# Patient Record
Sex: FEMALE | Race: WHITE | NOT HISPANIC OR LATINO
[De-identification: names, ages, dates, MRNs, and addresses within clinical notes are randomized per-mention and may not be internally consistent; named-entity substitution may affect disease eponyms.]

---

## 2017-01-03 ENCOUNTER — APPOINTMENT (OUTPATIENT)
Dept: UROLOGY | Facility: CLINIC | Age: 37
End: 2017-01-03

## 2017-09-26 ENCOUNTER — APPOINTMENT (OUTPATIENT)
Dept: UROLOGY | Facility: CLINIC | Age: 37
End: 2017-09-26
Payer: COMMERCIAL

## 2017-09-26 VITALS
WEIGHT: 200 LBS | HEART RATE: 69 BPM | OXYGEN SATURATION: 95 % | BODY MASS INDEX: 35.44 KG/M2 | HEIGHT: 63 IN | SYSTOLIC BLOOD PRESSURE: 112 MMHG | DIASTOLIC BLOOD PRESSURE: 75 MMHG | TEMPERATURE: 97.7 F | RESPIRATION RATE: 18 BRPM

## 2017-09-26 PROCEDURE — 51798 US URINE CAPACITY MEASURE: CPT

## 2017-09-26 PROCEDURE — 99215 OFFICE O/P EST HI 40 MIN: CPT

## 2017-10-09 ENCOUNTER — APPOINTMENT (OUTPATIENT)
Dept: UROLOGY | Facility: CLINIC | Age: 37
End: 2017-10-09
Payer: COMMERCIAL

## 2017-10-09 ENCOUNTER — OUTPATIENT (OUTPATIENT)
Dept: OUTPATIENT SERVICES | Facility: HOSPITAL | Age: 37
LOS: 1 days | End: 2017-10-09
Payer: COMMERCIAL

## 2017-10-09 VITALS
TEMPERATURE: 98 F | HEART RATE: 72 BPM | DIASTOLIC BLOOD PRESSURE: 79 MMHG | RESPIRATION RATE: 16 BRPM | SYSTOLIC BLOOD PRESSURE: 123 MMHG

## 2017-10-09 DIAGNOSIS — R35.0 FREQUENCY OF MICTURITION: ICD-10-CM

## 2017-10-09 DIAGNOSIS — N30.10 INTERSTITIAL CYSTITIS (CHRONIC) W/OUT HEMATURIA: ICD-10-CM

## 2017-10-09 DIAGNOSIS — M79.1 MYALGIA: ICD-10-CM

## 2017-10-09 DIAGNOSIS — R30.0 DYSURIA: ICD-10-CM

## 2017-10-09 DIAGNOSIS — R31.29 OTHER MICROSCOPIC HEMATURIA: ICD-10-CM

## 2017-10-09 PROCEDURE — 52000 CYSTOURETHROSCOPY: CPT

## 2017-10-09 PROCEDURE — 51700 IRRIGATION OF BLADDER: CPT | Mod: 59

## 2017-10-10 ENCOUNTER — MESSAGE (OUTPATIENT)
Age: 37
End: 2017-10-10

## 2017-10-17 ENCOUNTER — APPOINTMENT (OUTPATIENT)
Dept: UROLOGY | Facility: CLINIC | Age: 37
End: 2017-10-17

## 2017-10-17 DIAGNOSIS — M79.1 MYALGIA: ICD-10-CM

## 2017-10-17 DIAGNOSIS — N30.10 INTERSTITIAL CYSTITIS (CHRONIC) WITHOUT HEMATURIA: ICD-10-CM

## 2017-10-17 DIAGNOSIS — R30.0 DYSURIA: ICD-10-CM

## 2017-10-17 DIAGNOSIS — R31.29 OTHER MICROSCOPIC HEMATURIA: ICD-10-CM

## 2017-10-17 LAB
APPEARANCE: CLEAR
BACTERIA UR CULT: NORMAL
BACTERIA: NEGATIVE
BILIRUBIN URINE: NEGATIVE
BLOOD URINE: NEGATIVE
COLOR: YELLOW
GLUCOSE QUALITATIVE U: NEGATIVE MG/DL
HYALINE CASTS: 2 /LPF
KETONES URINE: NEGATIVE
LEUKOCYTE ESTERASE URINE: NEGATIVE
MICROSCOPIC-UA: NORMAL
NITRITE URINE: NEGATIVE
PH URINE: 7.5
PROTEIN URINE: NEGATIVE MG/DL
RED BLOOD CELLS URINE: 1 /HPF
SPECIFIC GRAVITY URINE: 1.01
SQUAMOUS EPITHELIAL CELLS: 1 /HPF
UROBILINOGEN URINE: NEGATIVE MG/DL
WHITE BLOOD CELLS URINE: 0 /HPF

## 2019-05-30 ENCOUNTER — APPOINTMENT (OUTPATIENT)
Dept: UROLOGY | Facility: CLINIC | Age: 39
End: 2019-05-30

## 2019-06-07 ENCOUNTER — APPOINTMENT (OUTPATIENT)
Dept: OTOLARYNGOLOGY | Facility: CLINIC | Age: 39
End: 2019-06-07
Payer: COMMERCIAL

## 2019-06-07 VITALS
BODY MASS INDEX: 35.44 KG/M2 | DIASTOLIC BLOOD PRESSURE: 83 MMHG | HEIGHT: 63 IN | HEART RATE: 76 BPM | WEIGHT: 200 LBS | SYSTOLIC BLOOD PRESSURE: 121 MMHG

## 2019-06-07 PROCEDURE — 99244 OFF/OP CNSLTJ NEW/EST MOD 40: CPT | Mod: 25

## 2019-06-07 PROCEDURE — 31231 NASAL ENDOSCOPY DX: CPT

## 2019-06-07 PROCEDURE — 69420 INCISION OF EARDRUM: CPT

## 2019-06-07 NOTE — HISTORY OF PRESENT ILLNESS
[de-identified] : 39F with new right hearing loss beginning April 2019.  Symptoms started around the time of a cold.  Hearig loss constant with associated pressure. Saw Dr. Castellanos, had tube placed in May 2019 but this clogged.  Has been treated with zpak, clinda, biaxin, prednisone, all without improvement. No prior history of ear tubes, did have prior ear infections when younger.  No prior history of hearing loss, no family history of early hearing loss.  Allergic to PCN, sulfa, cipro/levaquin, doxycycline--Gets rashes or has difficulty breathing.  Not diabetic, no known risk factors for immunosuppression

## 2019-06-07 NOTE — PHYSICAL EXAM
[Normal] : orientation to person, place, and time: normal [de-identified] : E [de-identified] : L TM nl, R clogged T-tube, removed but no middle ear drainage, granulation tissue in middle ear

## 2019-06-07 NOTE — PROCEDURE
[FreeTextEntry3] : Procedure note: Right myringotomy\par \par Jun 07, 2019 \par \par Description of Operative Procedure:  Risks, benefits, and alternatives of the planned procedure were discussed with the patient prior to proceeding.  Risks would include but are not limited to bleeding, infection, persistent drainage, persistent perforation, or failure to improve hearing.  Benefits would include improvement in hearing.  Topical anesthetic was used to anesthetize the tympanic membrane.  A myringotomy was made.  The eardrum was thickened and the middle ear had granulation tissue but no no serous fluid.  Neomycin drops were administered.  The patient tolerated the procedure without complications.\par \par

## 2019-06-07 NOTE — REVIEW OF SYSTEMS
[Seasonal Allergies] : seasonal allergies [Hearing Loss] : hearing loss [Ear Pain] : ear pain [Ear Drainage] : ear drainage [Negative] : Heme/Lymph

## 2019-06-07 NOTE — CONSULT LETTER
[FreeTextEntry2] : Eric Mcgee MD [FreeTextEntry1] : Dear Dr. Mcgee,\par \par Thank you very much for your consultation request regarding Shanda Bojorquez.  As you know, she is a pleasant 39-year-old woman with right-sided hearing loss since April 2019.  She has been treated with multiple courses of antibiotics and steroids for otitis media without resolution, and also underwent tube placement.  \par \par Her exam today demonstrates a clogged right T-tube, and after removal there is extensive granulation filling the middle ear space but no active otorrhea.  Her audiogram from your office demonstrates a right conductive hearing loss, and she has a CT demonstrating partial opacification of the right middle ear and mastoid.  Lastly, she has culture results positive for MSSA.\par \par As we had discussed, I believe she has experienced a refractory otitis causing chronic middle ear inflammation and persistent conductive hearing loss.  We discussed options for treatment, including placement of a larger bore ear tube, right tympanomastoidectomy, or continued medical management.  Given her multiple antibiotic allergies, there are not good oral antibiotic options for her which have not already been given.  I recommended that we reexamine the ear under anesthesia, and if her middle ear space again demonstrates extensive granulation when attempting to place a larger caliber ear tube, I would then plan to move forward with tympanomastoidectomy.  She is understanding of risks, benefits, and alternatives of surgery and wishes to proceed.\par \par Thank you once again for the opportunity to participate in your patient's care, and I will continue to keep you updated as to her progress.\par \par Best regards,\par \par Kehinde Bearden MD\par Otology/Neurotology\par Department of Otolaryngology\par [facility]

## 2019-06-18 ENCOUNTER — APPOINTMENT (OUTPATIENT)
Dept: UROLOGY | Facility: CLINIC | Age: 39
End: 2019-06-18

## 2019-06-20 ENCOUNTER — OUTPATIENT (OUTPATIENT)
Dept: OUTPATIENT SERVICES | Facility: HOSPITAL | Age: 39
LOS: 1 days | End: 2019-06-20

## 2019-06-20 VITALS
WEIGHT: 210.1 LBS | SYSTOLIC BLOOD PRESSURE: 120 MMHG | HEIGHT: 62.5 IN | DIASTOLIC BLOOD PRESSURE: 80 MMHG | RESPIRATION RATE: 16 BRPM | TEMPERATURE: 99 F | HEART RATE: 60 BPM

## 2019-06-20 DIAGNOSIS — Z98.890 OTHER SPECIFIED POSTPROCEDURAL STATES: Chronic | ICD-10-CM

## 2019-06-20 DIAGNOSIS — H70.11 CHRONIC MASTOIDITIS, RIGHT EAR: ICD-10-CM

## 2019-06-20 DIAGNOSIS — Z90.49 ACQUIRED ABSENCE OF OTHER SPECIFIED PARTS OF DIGESTIVE TRACT: Chronic | ICD-10-CM

## 2019-06-20 DIAGNOSIS — H91.90 UNSPECIFIED HEARING LOSS, UNSPECIFIED EAR: ICD-10-CM

## 2019-06-20 LAB
ANION GAP SERPL CALC-SCNC: 11 MMO/L — SIGNIFICANT CHANGE UP (ref 7–14)
BUN SERPL-MCNC: 18 MG/DL — SIGNIFICANT CHANGE UP (ref 7–23)
CALCIUM SERPL-MCNC: 9.9 MG/DL — SIGNIFICANT CHANGE UP (ref 8.4–10.5)
CHLORIDE SERPL-SCNC: 98 MMOL/L — SIGNIFICANT CHANGE UP (ref 98–107)
CO2 SERPL-SCNC: 27 MMOL/L — SIGNIFICANT CHANGE UP (ref 22–31)
CREAT SERPL-MCNC: 0.73 MG/DL — SIGNIFICANT CHANGE UP (ref 0.5–1.3)
GLUCOSE SERPL-MCNC: 83 MG/DL — SIGNIFICANT CHANGE UP (ref 70–99)
HCG SERPL-ACNC: < 5 MIU/ML — SIGNIFICANT CHANGE UP
HCT VFR BLD CALC: 41.2 % — SIGNIFICANT CHANGE UP (ref 34.5–45)
HGB BLD-MCNC: 13.5 G/DL — SIGNIFICANT CHANGE UP (ref 11.5–15.5)
MCHC RBC-ENTMCNC: 29.6 PG — SIGNIFICANT CHANGE UP (ref 27–34)
MCHC RBC-ENTMCNC: 32.8 % — SIGNIFICANT CHANGE UP (ref 32–36)
MCV RBC AUTO: 90.4 FL — SIGNIFICANT CHANGE UP (ref 80–100)
NRBC # FLD: 0 K/UL — SIGNIFICANT CHANGE UP (ref 0–0)
PLATELET # BLD AUTO: 281 K/UL — SIGNIFICANT CHANGE UP (ref 150–400)
PMV BLD: 10.1 FL — SIGNIFICANT CHANGE UP (ref 7–13)
POTASSIUM SERPL-MCNC: 3.7 MMOL/L — SIGNIFICANT CHANGE UP (ref 3.5–5.3)
POTASSIUM SERPL-SCNC: 3.7 MMOL/L — SIGNIFICANT CHANGE UP (ref 3.5–5.3)
RBC # BLD: 4.56 M/UL — SIGNIFICANT CHANGE UP (ref 3.8–5.2)
RBC # FLD: 12.7 % — SIGNIFICANT CHANGE UP (ref 10.3–14.5)
SODIUM SERPL-SCNC: 136 MMOL/L — SIGNIFICANT CHANGE UP (ref 135–145)
WBC # BLD: 9.77 K/UL — SIGNIFICANT CHANGE UP (ref 3.8–10.5)
WBC # FLD AUTO: 9.77 K/UL — SIGNIFICANT CHANGE UP (ref 3.8–10.5)

## 2019-06-20 NOTE — H&P PST ADULT - NSICDXPASTSURGICALHX_GEN_ALL_CORE_FT
PAST SURGICAL HISTORY:  H/O bilateral breast reduction surgery 2015    H/O exploratory laparotomy 2014    History of cholecystectomy laparoscopic-2002

## 2019-06-20 NOTE — H&P PST ADULT - NSANTHOSAYNRD_GEN_A_CORE
No. COCO screening performed.  STOP BANG Legend: 0-2 = LOW Risk; 3-4 = INTERMEDIATE Risk; 5-8 = HIGH Risk

## 2019-06-20 NOTE — H&P PST ADULT - NSICDXPASTMEDICALHX_GEN_ALL_CORE_FT
PAST MEDICAL HISTORY:  Interstitial cystitis     PCOS (polycystic ovarian syndrome) PAST MEDICAL HISTORY:  Interstitial cystitis     PCOS (polycystic ovarian syndrome)     Pelvic floor dysfunction     Spontaneous  2015    Vulvodynia PAST MEDICAL HISTORY:  Interstitial cystitis     Obese     PCOS (polycystic ovarian syndrome)     Pelvic floor dysfunction     Spontaneous  2015    Vulvodynia

## 2019-06-20 NOTE — H&P PST ADULT - HISTORY OF PRESENT ILLNESS
Pt. is a 40 yo female with hearing difficulty in the right ear.  Pt. had a myringotomy tube that has since been removed .

## 2019-06-20 NOTE — H&P PST ADULT - NSICDXPROBLEM_GEN_ALL_CORE_FT
PROBLEM DIAGNOSES  Problem: Hearing loss  Assessment and Plan: Pt. is scheduled for a right mastoidectomy, right myringotomy and tube, fascia graft 6/27/19.

## 2019-06-20 NOTE — H&P PST ADULT - TEACHING/LEARNING FACTORS IMPACT ABILITY TO LEARN
ACP planning begun today, SDM is.   tommy smith--has directive at home and will bring in "my learning disability"

## 2019-06-22 PROBLEM — N30.10 INTERSTITIAL CYSTITIS (CHRONIC) WITHOUT HEMATURIA: Chronic | Status: ACTIVE | Noted: 2019-06-20

## 2019-06-22 PROBLEM — O03.9 COMPLETE OR UNSPECIFIED SPONTANEOUS ABORTION WITHOUT COMPLICATION: Chronic | Status: ACTIVE | Noted: 2019-06-20

## 2019-06-22 PROBLEM — E66.9 OBESITY, UNSPECIFIED: Chronic | Status: ACTIVE | Noted: 2019-06-20

## 2019-06-22 PROBLEM — M62.89 OTHER SPECIFIED DISORDERS OF MUSCLE: Chronic | Status: ACTIVE | Noted: 2019-06-20

## 2019-06-22 PROBLEM — E28.2 POLYCYSTIC OVARIAN SYNDROME: Chronic | Status: ACTIVE | Noted: 2019-06-20

## 2019-06-22 PROBLEM — N94.819 VULVODYNIA, UNSPECIFIED: Chronic | Status: ACTIVE | Noted: 2019-06-20

## 2019-06-26 ENCOUNTER — TRANSCRIPTION ENCOUNTER (OUTPATIENT)
Age: 39
End: 2019-06-26

## 2019-06-27 ENCOUNTER — APPOINTMENT (OUTPATIENT)
Dept: OTOLARYNGOLOGY | Facility: AMBULATORY SURGERY CENTER | Age: 39
End: 2019-06-27

## 2019-06-27 ENCOUNTER — OUTPATIENT (OUTPATIENT)
Dept: OUTPATIENT SERVICES | Facility: HOSPITAL | Age: 39
LOS: 1 days | Discharge: HOME | End: 2019-06-27
Payer: COMMERCIAL

## 2019-06-27 VITALS
RESPIRATION RATE: 18 BRPM | HEART RATE: 77 BPM | OXYGEN SATURATION: 98 % | DIASTOLIC BLOOD PRESSURE: 69 MMHG | HEIGHT: 62.5 IN | WEIGHT: 210.1 LBS | SYSTOLIC BLOOD PRESSURE: 118 MMHG | TEMPERATURE: 98 F

## 2019-06-27 VITALS — OXYGEN SATURATION: 100 % | DIASTOLIC BLOOD PRESSURE: 60 MMHG | SYSTOLIC BLOOD PRESSURE: 110 MMHG | HEART RATE: 90 BPM

## 2019-06-27 DIAGNOSIS — H70.11 CHRONIC MASTOIDITIS, RIGHT EAR: ICD-10-CM

## 2019-06-27 DIAGNOSIS — Z90.49 ACQUIRED ABSENCE OF OTHER SPECIFIED PARTS OF DIGESTIVE TRACT: Chronic | ICD-10-CM

## 2019-06-27 DIAGNOSIS — Z98.890 OTHER SPECIFIED POSTPROCEDURAL STATES: Chronic | ICD-10-CM

## 2019-06-27 PROCEDURE — 69436 CREATE EARDRUM OPENING: CPT | Mod: 59

## 2019-06-27 PROCEDURE — 69643 REVISE MIDDLE EAR & MASTOID: CPT

## 2019-06-27 NOTE — ASU DISCHARGE PLAN (ADULT/PEDIATRIC) - CALL YOUR DOCTOR IF YOU HAVE ANY OF THE FOLLOWING:
Fever greater than (need to indicate Fahrenheit or Celsius)/Inability to tolerate liquids or foods/Nausea and vomiting that does not stop/101/Increased irritability or sluggishness/Bleeding that does not stop/Pain not relieved by Medications Pain not relieved by Medications/101F/Nausea and vomiting that does not stop/Bleeding that does not stop/Fever greater than (need to indicate Fahrenheit or Celsius)/Increased irritability or sluggishness/Inability to tolerate liquids or foods

## 2019-06-27 NOTE — ASU PREOP CHECKLIST - NOTHING BY MOUTH SINCE
Post-Care Instructions: I reviewed with the patient in detail post-care instructions. Patient is to wear sunprotection, and avoid picking at any of the treated lesions. Pt may apply Vaseline to crusted or scabbing areas. Duration Of Freeze Thaw-Cycle (Seconds): 0 Detail Level: Simple Render Post-Care Instructions In Note?: no Number Of Freeze-Thaw Cycles: 3 freeze-thaw cycles Consent: The patient's consent was obtained including but not limited to risks of crusting, scabbing, blistering, scarring, darker or lighter pigmentary change, recurrence, incomplete removal and infection. 26-Jun-2019 20:30

## 2019-06-27 NOTE — ASU DISCHARGE PLAN (ADULT/PEDIATRIC) - ASU DC SPECIAL INSTRUCTIONSFT
[address] [place] [address] Patient Status:  Inpatient    1955 MRN [id number]   [facility] 4SW-A Attending Mirella Headley, *   Hosp Day # 1 PCP Dilip BOLTON     Cc: arrest    History of Present Illness: Lispro 100 TID before meals  ALPRAZolam 0.25 MG TID PRN anxiety   albuterol nebulizer q 4 hours PRN wheezing  nystatin 827117 UNIT/GM External Ointment daily   DiphenhydrAMINE HCl 25 q 6 hours PRN itching   Levothyroxine Sodium 50 MCG daily with breakfast  There is some mild narrowing of the prepontine cistern. Findings are suspicious for early herniation. The critical value results were called to Dr. Lynn Prince at 1600 hours on  4/1/2018. Result read back was performed.      Dictated by: Deedee Urena and children at bedside, they understand grave prognosis and no hope for meaningful recovery. Awaiting more family members to arrive to transition to palliative care. [facility] will continue to follow the patient while in-house. See postoperative tympanomastoidectomy instruction sheet.

## 2019-06-28 LAB
SPECIMEN SOURCE: SIGNIFICANT CHANGE UP
SPECIMEN SOURCE: SIGNIFICANT CHANGE UP

## 2019-07-02 LAB
CULTURE - EAR: SIGNIFICANT CHANGE UP
CULTURE - EAR: SIGNIFICANT CHANGE UP

## 2019-07-03 ENCOUNTER — APPOINTMENT (OUTPATIENT)
Dept: INFECTIOUS DISEASE | Facility: CLINIC | Age: 39
End: 2019-07-03
Payer: COMMERCIAL

## 2019-07-03 VITALS
OXYGEN SATURATION: 98 % | HEIGHT: 63 IN | HEART RATE: 68 BPM | WEIGHT: 212 LBS | TEMPERATURE: 96.8 F | SYSTOLIC BLOOD PRESSURE: 127 MMHG | BODY MASS INDEX: 37.56 KG/M2 | DIASTOLIC BLOOD PRESSURE: 85 MMHG

## 2019-07-03 DIAGNOSIS — H60.90 UNSPECIFIED OTITIS EXTERNA, UNSPECIFIED EAR: ICD-10-CM

## 2019-07-03 DIAGNOSIS — Z87.898 PERSONAL HISTORY OF OTHER SPECIFIED CONDITIONS: ICD-10-CM

## 2019-07-03 DIAGNOSIS — Z82.49 FAMILY HISTORY OF ISCHEMIC HEART DISEASE AND OTHER DISEASES OF THE CIRCULATORY SYSTEM: ICD-10-CM

## 2019-07-03 DIAGNOSIS — Z82.5 FAMILY HISTORY OF ASTHMA AND OTHER CHRONIC LOWER RESPIRATORY DISEASES: ICD-10-CM

## 2019-07-03 DIAGNOSIS — Z83.42 FAMILY HISTORY OF FAMILIAL HYPERCHOLESTEROLEMIA: ICD-10-CM

## 2019-07-03 LAB — SPECIMEN SOURCE: SIGNIFICANT CHANGE UP

## 2019-07-03 PROCEDURE — 99243 OFF/OP CNSLTJ NEW/EST LOW 30: CPT

## 2019-07-03 NOTE — CONSULT LETTER
[( Thank you for referring [unfilled] for consultation for _____ )] : Thank you for referring [unfilled] for consultation for [unfilled] [Dear  ___] : Dear  [unfilled], [Please see my note below.] : Please see my note below. [FreeTextEntry2] : Dr. Deondre Bearden [FreeTextEntry3] : Thank you for allowing me to participate in the care of this patient.  Please feel free to contact me with any questions.\par \par Sincerely, \par Michael I. Oppenheim, MD\par \par

## 2019-07-03 NOTE — DATA REVIEWED
[FreeTextEntry1] : 5/20 ear culture (taken via swab per patient but after TM tube placed)\par MSSA, inducible clinda resistance Linezolid sensitive\par \par OR Cultures (bacterial, fungal) NG

## 2019-07-03 NOTE — REVIEW OF SYSTEMS
[Chills] : no chills [Fever] : no fever [Feeling Sick] : not feeling sick [Feeling Tired] : not feeling tired [Normal Appetite] : normal appetite [Eye Pain] : no eye pain [Discharge From Eyes] : no purulent discharge from the eyes [Red Eyes] : eyes not red [Eyesight Problems] : no eyesight problems [Nasal Discharge] : no nasal discharge [As Noted in HPI] : as noted in HPI [Sore Throat] : no sore throat [Hoarseness] : no hoarseness [Chest Pain] : no chest pain [Palpitations] : no palpitations [Leg Claudication] : no intermittent leg claudication [Lower Ext Edema] : no lower extremity edema [Wheezing] : no wheezing [Shortness Of Breath] : no shortness of breath [SOB on Exertion] : no shortness of breath during exertion [Cough] : no cough [Abdominal Pain] : no abdominal pain [Sputum] : not coughing up ~M sputum [Vomiting] : no vomiting [Constipation] : no constipation [Dysuria] : no dysuria [Diarrhea] : no diarrhea [Joint Pain] : no joint pain [Joint Swelling] : no joint swelling [Joint Stiffness] : no joint stiffness [Limb Pain] : no limb pain [Skin Wound] : no skin wound [Skin Lesions] : no skin lesions [Confused] : no confusion [Itching] : no itching [Convulsions] : no convulsions

## 2019-07-03 NOTE — ASSESSMENT
[FreeTextEntry1] : 39 year old s/p typanoplasty, debridement of middle ear and mastoid, mastoidectomy with prior culture of presumably middle ear fluid with MSSA, now with negative OR cultures but after a number of courses of Clindamycin.  Patient also with allergies to most major oral drug classes including penicillin, cephalosporin, quinolone, tetracycline classes. \par \par For Staph, therefore, will initiate Linezolid 600 mg po BID.  Would prefer to have gram negative coverage as well but no good oral options.\par \par Discussed the option of PICC line for IV therapy with patient if Linezolid not succesful; patient noted that she will be going back to work in a few weeks and cannot have PICC line at work.  Discussed whether it made more sense to start with PICC now to assure success if PICC in future not going to be feasible; patient opts to try Linezolid first.

## 2019-07-03 NOTE — HISTORY OF PRESENT ILLNESS
[FreeTextEntry1] : 39 year old with multiple drug allergies - in spring of 2019 developed pain in eight ear, behind ear and in neck after URI.  Also noted hearing loss.  [facility] treated with Z-Pack for otitis.  Didn't improve, saw ENT in [facility] who started Prednisone then Clindamycin and placed ear tube and took specimen from canal after tube in place (per patient).  Cluture revealed MSSA with inducible resistance to Clinda.  Patient did not improve and eventually saw Dr. Bearden.  CT scan 6/3 showed opacification of middle ear and mastoids without obvious cholesteatoma.  Multiple attempts at tube failed because of tube clogging.  Eventually underwent myringotomy/mastoidectomy and tube placement on 6/27, put back on Clindamycin and Prednisone, now referred for evaluation.  \par \par No fever, chills.  Still had drainage from ear (Yellow/Orange).  Hearing not improved but packing still in ear.  Pain significantly diminished since surgery.\par \par Patient with reported allergies to PCN, Sulfa drugs, quinolones, cephalosporins, and doxycycline (all hives, PCN and sulfur associated with SOB as well).\par \par Prior history of mastitis with Staph and Serratia (after breast reduction) treated with vanco/aztreo....

## 2019-07-03 NOTE — PHYSICAL EXAM
[General Appearance - Alert] : alert [Sclera] : the sclera and conjunctiva were normal [General Appearance - In No Acute Distress] : in no acute distress [General Appearance - Well Nourished] : well nourished [FreeTextEntry1] : Normal left TM.  Right TM only partially visualized, Some inflammation of canal; whitish material over TM (packing?).  Well healing scar behind right ear [PERRL With Normal Accommodation] : pupils were equal in size, round, reactive to light [Oropharynx] : the oropharynx was normal with no thrush [Neck Appearance] : the appearance of the neck was normal [Neck Cervical Mass (___cm)] : no neck mass was observed [Auscultation Breath Sounds / Voice Sounds] : lungs were clear to auscultation bilaterally [Heart Rate And Rhythm] : heart rate was normal and rhythm regular [Heart Sounds] : normal S1 and S2 [Heart Sounds Gallop] : no gallops [Murmurs] : no murmurs [Heart Sounds Pericardial Friction Rub] : no pericardial rub [Abdomen Tenderness] : non-tender [Abdomen Soft] : soft [Bowel Sounds] : normal bowel sounds [Abdomen Mass (___ Cm)] : no abdominal mass palpated [] : no rash [No Palpable Adenopathy] : no palpable adenopathy [Skin Color & Pigmentation] : normal skin color and pigmentation [Skin Lesions] : no skin lesions

## 2019-07-05 DIAGNOSIS — H70.11 CHRONIC MASTOIDITIS, RIGHT EAR: ICD-10-CM

## 2019-07-11 ENCOUNTER — APPOINTMENT (OUTPATIENT)
Dept: OTOLARYNGOLOGY | Facility: CLINIC | Age: 39
End: 2019-07-11
Payer: COMMERCIAL

## 2019-07-11 VITALS
SYSTOLIC BLOOD PRESSURE: 122 MMHG | HEART RATE: 76 BPM | BODY MASS INDEX: 37.21 KG/M2 | HEIGHT: 63 IN | WEIGHT: 210 LBS | DIASTOLIC BLOOD PRESSURE: 84 MMHG

## 2019-07-11 PROCEDURE — 99024 POSTOP FOLLOW-UP VISIT: CPT

## 2019-07-11 RX ORDER — PREDNISONE 10 MG/1
10 TABLET ORAL
Qty: 30 | Refills: 0 | Status: DISCONTINUED | COMMUNITY
Start: 2019-06-27 | End: 2019-07-11

## 2019-07-11 NOTE — CONSULT LETTER
[FreeTextEntry2] : Eric Mcgee MD [FreeTextEntry1] : Dear Dr. Mcgee,\par \par Shanda presents for her first postoperative visit.  As you know, she is a very pleasant 39-year-old woman who is now 2 and a half weeks status post right tympanomastoidectomy and repeat ear tube placement.  At the time of surgery she was found to have significant granulation and inflammatory tissue in the middle ear space and mastoid cavity which was surgically debrided.  Her postoperative course thus far has been uncomplicated, and she already reports some hearing improvement.  She has also seen the infectious disease team here at [facility] since surgery, and is currently on a prolonged course of Linezolid.  Exam today shows a patent dry tube in the right ear, normal facial function, in well-healed postauricular incision.  Tuning fork exam appears consistent with a mild conductive hearing loss.  She will continue Linezolid per ID recommendations and followup with me in one month.\par \par Thank you once again for the opportunity to participate in your patient's care, and I will continue to keep you updated as to her progress.\par \par Best regards,\par \par Kehinde Bearden MD\par Otology/Neurotology\par Department of Otolaryngology\par [facility]

## 2019-07-11 NOTE — REASON FOR VISIT
[Subsequent Evaluation] : a subsequent evaluation for [Other: _____] : [unfilled] [FreeTextEntry2] : follow s/p Right tympanoplasty with mastoidectomy, right myringotomy and tube insertion, 6/27/19

## 2019-07-11 NOTE — PHYSICAL EXAM
[de-identified] : packing removed R ear canal; right postauricular incision healing well, no erythema, mild superior edema in scalp region. [de-identified] : R TM with dry patent tube, Larios to right, Bozenane AC>BC right ear [Normal] : no rashes [FreeTextEntry2] : Facial nerve function is House Brackmann 1/6 in right ear and 1/6 in left ear.

## 2019-07-17 ENCOUNTER — APPOINTMENT (OUTPATIENT)
Dept: INFECTIOUS DISEASE | Facility: CLINIC | Age: 39
End: 2019-07-17
Payer: COMMERCIAL

## 2019-07-17 VITALS
TEMPERATURE: 98.5 F | HEART RATE: 78 BPM | DIASTOLIC BLOOD PRESSURE: 80 MMHG | SYSTOLIC BLOOD PRESSURE: 123 MMHG | HEIGHT: 63 IN | OXYGEN SATURATION: 98 %

## 2019-07-17 DIAGNOSIS — Z51.81 ENCOUNTER FOR THERAPEUTIC DRUG LVL MONITORING: ICD-10-CM

## 2019-07-17 DIAGNOSIS — Z88.9 ALLERGY STATUS TO UNSPECIFIED DRUGS, MEDICAMENTS AND BIOLOGICAL SUBSTANCES: ICD-10-CM

## 2019-07-17 PROCEDURE — 99213 OFFICE O/P EST LOW 20 MIN: CPT

## 2019-07-17 NOTE — PHYSICAL EXAM
[Auscultation Breath Sounds / Voice Sounds] : lungs were clear to auscultation bilaterally [Heart Rate And Rhythm] : heart rate was normal and rhythm regular [Heart Sounds] : normal S1 and S2 [Heart Sounds Gallop] : no gallops [Murmurs] : no murmurs [Heart Sounds Pericardial Friction Rub] : no pericardial rub [Bowel Sounds] : normal bowel sounds [Abdomen Soft] : soft [Abdomen Tenderness] : non-tender [] : no hepato-splenomegaly [Abdomen Mass (___ Cm)] : no abdominal mass palpated [FreeTextEntry1] : No cervical ALIX

## 2019-07-17 NOTE — HISTORY OF PRESENT ILLNESS
[FreeTextEntry1] : 2 weeks into linezolid (with Clindamycin)-  agrees to  full 4 weeks.\par \par No drainage form ear, no pain.  Sense of fullness / pressure / clogged sensation (had contacted and discussed with Dr. Bearden).\par \par No fever, no chills.  Slight GI upset from medication (slight nausea and burning in esophagus), no diarrhea but stool slightly soft.  No easy bruising.  No change in appetite.\par \par

## 2019-07-17 NOTE — ASSESSMENT
[FreeTextEntry1] : Doing well two weeks into Linezolid + clindamycin for MSSA, 3 weeks s/p typanoplasty/mastoidectomy.\par \par To complete full 4 weeks of therapy. \par \par Advised that ear looks well and tube looks patent but with persistent post-op changes that should improve over time.\par \par Will check Plt / LFTs today to assure no toxicity from Linezolid.  \par \par Patient to f/u with Dr. Bearden, return to see me PRN only.

## 2019-07-17 NOTE — REASON FOR VISIT
[Follow-Up: _____] : a [unfilled] follow-up visit [FreeTextEntry1] : otomastoidits / multiple drug allergies

## 2019-07-30 LAB — FUNGUS SPEC QL CULT: SIGNIFICANT CHANGE UP

## 2019-07-31 ENCOUNTER — APPOINTMENT (OUTPATIENT)
Dept: OTOLARYNGOLOGY | Facility: CLINIC | Age: 39
End: 2019-07-31
Payer: COMMERCIAL

## 2019-07-31 VITALS
HEART RATE: 83 BPM | WEIGHT: 210 LBS | BODY MASS INDEX: 37.21 KG/M2 | SYSTOLIC BLOOD PRESSURE: 126 MMHG | HEIGHT: 63 IN | DIASTOLIC BLOOD PRESSURE: 79 MMHG

## 2019-07-31 PROCEDURE — 99024 POSTOP FOLLOW-UP VISIT: CPT

## 2019-07-31 NOTE — PHYSICAL EXAM
[de-identified] : dry patent right ear tube, thin film of dried debris removed from surface of TM, patient noted subjective improvement; Rinne positive right ear, Larios weakly lateralizes to the right [de-identified] : dry right ear canal [FreeTextEntry2] : Facial nerve function is House Brackmann 1/6 in right ear and 1/6 in left ear. [Normal] : no rashes

## 2019-07-31 NOTE — HISTORY OF PRESENT ILLNESS
[de-identified] : hearing loss improved relative to before surgery, but still with "clogged ear" sensation and fullness, no drainage

## 2019-07-31 NOTE — CONSULT LETTER
[FreeTextEntry2] : Eric Mcgee MD [FreeTextEntry1] : Dear Dr. Mcgee,\par \par Shanda presents today for followup.  She is now one month status post right tympanomastoidectomy and tube placement.  Since her last visit she notes further improvement in her right hearing loss, and has not had any ear drainage.  She still does not feel like her hearing has normalized, but it is subjectively better than just before surgery.  \par \par Otoscopic exam today shows a dry patient right ear tube.  Larios lateralizes weakly to the right and Rinne is positive in the right ear.\par \par Shanda is doing well following right tympanomastoidectomy.  She should continue to maintain dry ear precautions and complete her one month oral Linezolid regimen as per ID recommendations.  I will plan to see her back in 2 months with a postoperative audiogram.\par \par Thank you once again for the opportunity to participate in your patient's care, and I will continue to keep you updated as to her progress.\par \par Best regards,\par \par Kehinde Bearden MD\par Otology/Neurotology\par Department of Otolaryngology\par [facility]

## 2019-09-04 LAB
ALBUMIN SERPL ELPH-MCNC: 4.2 G/DL
ALBUMIN SERPL ELPH-MCNC: 4.4 G/DL
ALP BLD-CCNC: 52 U/L
ALP BLD-CCNC: 61 U/L
ALT SERPL-CCNC: 17 U/L
ALT SERPL-CCNC: 18 U/L
ANION GAP SERPL CALC-SCNC: 11 MMOL/L
ANION GAP SERPL CALC-SCNC: 13 MMOL/L
AST SERPL-CCNC: 12 U/L
AST SERPL-CCNC: 16 U/L
BASOPHILS # BLD AUTO: 0.06 K/UL
BASOPHILS NFR BLD AUTO: 0.6 %
BILIRUB SERPL-MCNC: 0.3 MG/DL
BILIRUB SERPL-MCNC: <0.2 MG/DL
BUN SERPL-MCNC: 14 MG/DL
BUN SERPL-MCNC: 18 MG/DL
CALCIUM SERPL-MCNC: 10.4 MG/DL
CALCIUM SERPL-MCNC: 9.3 MG/DL
CHLORIDE SERPL-SCNC: 102 MMOL/L
CHLORIDE SERPL-SCNC: 98 MMOL/L
CO2 SERPL-SCNC: 26 MMOL/L
CO2 SERPL-SCNC: 26 MMOL/L
CREAT SERPL-MCNC: 0.72 MG/DL
CREAT SERPL-MCNC: 0.73 MG/DL
EOSINOPHIL # BLD AUTO: 0.14 K/UL
EOSINOPHIL NFR BLD AUTO: 1.4 %
HCT VFR BLD CALC: 38.8 %
HGB BLD-MCNC: 12.4 G/DL
IMM GRANULOCYTES NFR BLD AUTO: 0.3 %
LYMPHOCYTES # BLD AUTO: 2.49 K/UL
LYMPHOCYTES NFR BLD AUTO: 24.9 %
MAN DIFF?: NORMAL
MCHC RBC-ENTMCNC: 29.6 PG
MCHC RBC-ENTMCNC: 32 GM/DL
MCV RBC AUTO: 92.6 FL
MONOCYTES # BLD AUTO: 0.86 K/UL
MONOCYTES NFR BLD AUTO: 8.6 %
NEUTROPHILS # BLD AUTO: 6.44 K/UL
NEUTROPHILS NFR BLD AUTO: 64.2 %
PLATELET # BLD AUTO: 226 K/UL
POTASSIUM SERPL-SCNC: 4.3 MMOL/L
POTASSIUM SERPL-SCNC: 5.2 MMOL/L
PROT SERPL-MCNC: 6.5 G/DL
PROT SERPL-MCNC: 7.5 G/DL
RBC # BLD: 4.19 M/UL
RBC # FLD: 12.5 %
SODIUM SERPL-SCNC: 137 MMOL/L
SODIUM SERPL-SCNC: 139 MMOL/L
WBC # FLD AUTO: 10.02 K/UL

## 2019-09-20 ENCOUNTER — APPOINTMENT (OUTPATIENT)
Dept: OTOLARYNGOLOGY | Facility: CLINIC | Age: 39
End: 2019-09-20
Payer: COMMERCIAL

## 2019-09-20 PROCEDURE — 92567 TYMPANOMETRY: CPT

## 2019-09-20 PROCEDURE — 99024 POSTOP FOLLOW-UP VISIT: CPT

## 2019-09-20 PROCEDURE — 92557 COMPREHENSIVE HEARING TEST: CPT

## 2019-09-20 RX ORDER — ONDANSETRON 4 MG/1
4 TABLET, ORALLY DISINTEGRATING ORAL
Qty: 30 | Refills: 0 | Status: DISCONTINUED | COMMUNITY
Start: 2019-06-27 | End: 2019-09-20

## 2019-09-20 RX ORDER — HYDROCODONE BITARTRATE AND ACETAMINOPHEN 5; 325 MG/1; MG/1
5-325 TABLET ORAL
Qty: 30 | Refills: 0 | Status: DISCONTINUED | COMMUNITY
Start: 2019-06-27 | End: 2019-09-20

## 2019-09-20 RX ORDER — CLINDAMYCIN HYDROCHLORIDE 300 MG/1
300 CAPSULE ORAL EVERY 6 HOURS
Qty: 56 | Refills: 0 | Status: DISCONTINUED | COMMUNITY
Start: 2019-06-27 | End: 2019-09-20

## 2019-09-20 NOTE — REASON FOR VISIT
[Subsequent Evaluation] : a subsequent evaluation for [FreeTextEntry2] : s/p Right tympanoplasty with mastoidectomy, right myringotomy and tube insertion, 6/27/19

## 2019-09-20 NOTE — HISTORY OF PRESENT ILLNESS
[de-identified] : 39 year old female s/p Right tympanoplasty with mastoidectomy, right myringotomy and tube insertion, 6/27/19. Reports hearing loss has improved since surgery. Reports mild intermittent otalgia. Denies otorrhea, ear infection, tinnitus, dizziness.

## 2019-09-20 NOTE — PHYSICAL EXAM
[de-identified] : Patent right ear tube, tympanic membrane translucent without middle ear inflammation

## 2019-11-29 NOTE — H&P PST ADULT - NS MD HP INPLANTS MED DEV
Pt ambulated to the restroom, pt instructed on urine sample needed and verbalizes understanding.    None

## 2020-03-20 ENCOUNTER — APPOINTMENT (OUTPATIENT)
Dept: OTOLARYNGOLOGY | Facility: CLINIC | Age: 40
End: 2020-03-20

## 2020-05-14 ENCOUNTER — TRANSCRIPTION ENCOUNTER (OUTPATIENT)
Age: 40
End: 2020-05-14

## 2020-07-14 ENCOUNTER — APPOINTMENT (OUTPATIENT)
Dept: OTOLARYNGOLOGY | Facility: CLINIC | Age: 40
End: 2020-07-14
Payer: COMMERCIAL

## 2020-07-14 VITALS
DIASTOLIC BLOOD PRESSURE: 87 MMHG | SYSTOLIC BLOOD PRESSURE: 125 MMHG | TEMPERATURE: 98.1 F | HEART RATE: 69 BPM | WEIGHT: 224 LBS | HEIGHT: 63 IN | BODY MASS INDEX: 39.69 KG/M2

## 2020-07-14 PROCEDURE — 92567 TYMPANOMETRY: CPT

## 2020-07-14 PROCEDURE — 92557 COMPREHENSIVE HEARING TEST: CPT

## 2020-07-14 PROCEDURE — 99213 OFFICE O/P EST LOW 20 MIN: CPT | Mod: 25

## 2020-07-14 PROCEDURE — 92504 EAR MICROSCOPY EXAMINATION: CPT

## 2020-07-14 RX ORDER — LINEZOLID 600 MG/1
600 TABLET, FILM COATED ORAL
Qty: 28 | Refills: 0 | Status: DISCONTINUED | COMMUNITY
Start: 2019-07-03 | End: 2020-07-14

## 2020-07-14 NOTE — PROCEDURE
[FreeTextEntry3] : Procedure note:  Binocular microscopy\par \par Jul 14, 2020 \par \par Inspection of the ears was performed under binocular microscopy because of need to accurately visualize otologic landmarks and potential pathologic conditions that would not otherwise be visible through simple otoscopic exam.\par

## 2020-07-14 NOTE — REASON FOR VISIT
[Subsequent Evaluation] : a subsequent evaluation for [Other: _____] : [unfilled] [FreeTextEntry2] : follow up for chronic Right mastoiditis, CHL and Right ETD, s/p Right tympanoplasty with mastoidectomy, right myringotomy and tube insertion, 6/27/19.

## 2020-07-14 NOTE — CONSULT LETTER
[FreeTextEntry2] : Eric Mcgee MD [FreeTextEntry1] : Dear Dr. Mcgee,\par \par Shanda Bojorquez presents for followup for her right eustachian tube dysfunction. As you know, she is a very pleasant 40-year-old woman who is more than one year status post right tympanomastoidectomy with ear tube insertion.  Overall she's been doing well since her last visit in September without pain or drainage.  Her exam today shows a patent dry right ear tube, and audiogram shows stable improved right conductive hearing loss and relative to her early postoperative testing.  Given the stability in her exam today, I recommended followup in 6 months.  \par \par Thank you once again for the opportunity to participate in your patient's care, and I will keep you updated as to her progress.\par \par Best regards,\par \par Kehinde Bearden\par Otology/Neurotology\par Department of Otolaryngology\par [facility]\par [facility]

## 2020-07-14 NOTE — DATA REVIEWED
[de-identified] : I personally reviewed the patient's audiogram, which shows stable hearing b/l.\par

## 2020-07-14 NOTE — HISTORY OF PRESENT ILLNESS
[de-identified] : 40 year old female follow up for chronic Right mastoiditis, CHL and Right ETD, s/p Right tympanoplasty with mastoidectomy, right myringotomy and tube insertion, 6/27/19.  Reports ear itching with intermittent pain below/behind ear.  States has ear pressure on Right, tube in place.  Denies changes in hearing, dizziness or vertigo.  Reports occasional headaches and nausea, no vomiting.  Reports intermittent bilateral tinnitus, no otorrhea.  Denies recent fevers and/or infections.

## 2020-07-14 NOTE — PHYSICAL EXAM
[de-identified] : Patent right ear tube, tympanic membrane translucent without middle ear inflammation [Normal] : no rashes [FreeTextEntry2] : Facial nerve function is House Brackmann 1/6 in right ear and 1/6 in left ear.

## 2020-07-24 ENCOUNTER — APPOINTMENT (OUTPATIENT)
Dept: OTOLARYNGOLOGY | Facility: CLINIC | Age: 40
End: 2020-07-24

## 2020-07-28 ENCOUNTER — APPOINTMENT (OUTPATIENT)
Dept: OTOLARYNGOLOGY | Facility: CLINIC | Age: 40
End: 2020-07-28

## 2020-08-14 ENCOUNTER — APPOINTMENT (OUTPATIENT)
Dept: OTOLARYNGOLOGY | Facility: CLINIC | Age: 40
End: 2020-08-14
Payer: COMMERCIAL

## 2020-08-14 VITALS — WEIGHT: 224 LBS | BODY MASS INDEX: 39.69 KG/M2 | HEIGHT: 63 IN | TEMPERATURE: 98.1 F

## 2020-08-14 PROCEDURE — 92504 EAR MICROSCOPY EXAMINATION: CPT

## 2020-08-14 PROCEDURE — 99214 OFFICE O/P EST MOD 30 MIN: CPT | Mod: 25

## 2020-08-14 RX ORDER — METHYLPREDNISOLONE 4 MG/1
4 TABLET ORAL
Qty: 1 | Refills: 0 | Status: DISCONTINUED | COMMUNITY
Start: 2020-08-06 | End: 2020-08-14

## 2020-08-14 RX ORDER — MULTIVIT-MINS NO.5/FOLIC ACID 1 MG
CAPSULE ORAL
Refills: 0 | Status: DISCONTINUED | COMMUNITY
End: 2020-08-14

## 2020-08-14 NOTE — PHYSICAL EXAM
[Rinne Test Air Conduction Persists > Bone Conduction Right] : air conduction greater than bone conduction on the right [Rinne Test Air Conduction Persists > Bone Conduction Left] : air conduction greater than bone conduction on the left [Larios Test Lateralizes To Right] : tone lateralization to the right [Normal] : no rashes [Hearing Loss Right Only] : normal [Hearing Loss Left Only] : normal [de-identified] : PE tube in-situ, middle ear space dry, small polyp adjacent to tube

## 2020-08-14 NOTE — REASON FOR VISIT
[Subsequent Evaluation] : a subsequent evaluation for [Other: _____] : [unfilled] [FreeTextEntry2] :  right ear drainage, chronic Right mastoiditis, CHL and Right ETD, s/p Right tympanoplasty with mastoidectomy, right myringotomy and tube insertion, 6/27/19

## 2020-08-14 NOTE — HISTORY OF PRESENT ILLNESS
[de-identified] : 40 year old female follow up for right ear drainage.  chronic Right mastoiditis, CHL and Right ETD, s/p Right tympanoplasty with mastoidectomy, right myringotomy and tube insertion, 6/27/19. Reports intermittent right ear drainage with otalgia since 08/05/2020. Reports slight decrease in hearing. Denies tinnitus, fevers or infections, dizziness.  Drainage improved since starting clindamycin.

## 2020-08-14 NOTE — PROCEDURE
[FreeTextEntry3] : Procedure note:  Binocular microscopy\par \par Aug 14, 2020 \par \par Inspection of the ears was performed under binocular microscopy because of need to accurately visualize otologic landmarks and potential pathologic conditions that would not otherwise be visible through simple otoscopic exam.\par

## 2020-09-02 ENCOUNTER — APPOINTMENT (OUTPATIENT)
Dept: OTOLARYNGOLOGY | Facility: CLINIC | Age: 40
End: 2020-09-02
Payer: COMMERCIAL

## 2020-09-02 VITALS — TEMPERATURE: 97.6 F | WEIGHT: 224 LBS | BODY MASS INDEX: 39.69 KG/M2 | HEIGHT: 63 IN

## 2020-09-02 PROCEDURE — 99213 OFFICE O/P EST LOW 20 MIN: CPT | Mod: 25

## 2020-09-02 PROCEDURE — 92504 EAR MICROSCOPY EXAMINATION: CPT

## 2020-09-02 NOTE — PROCEDURE
[FreeTextEntry3] : Procedure note:  Binocular microscopy\par \par Sep 02, 2020 \par \par Inspection of the ears was performed under binocular microscopy because of need to accurately visualize otologic landmarks and potential pathologic conditions that would not otherwise be visible through simple otoscopic exam.\par

## 2020-09-02 NOTE — DATA REVIEWED
[de-identified] : I personally reviewed temporal bone CT images and the report, which shows minimal opacification right mastoid tip inferiorly, most of mastoid cavity well-aerated

## 2020-09-02 NOTE — PHYSICAL EXAM
[Normal] : no rashes [de-identified] : patent dry right PE tube, no granulation/polyp; L TM intact

## 2020-09-02 NOTE — HISTORY OF PRESENT ILLNESS
[de-identified] : 40 year old female follow up for right ear drainage.  chronic Right mastoiditis, CHL and Right ETD, s/p Right tympanoplasty with mastoidectomy, right myringotomy and tube insertion, 6/27/19. Reports she is feeling better, no otorrhea, no otalgia.  Mild right ear fullness, back to her baseline.

## 2020-12-16 ENCOUNTER — APPOINTMENT (OUTPATIENT)
Dept: OTOLARYNGOLOGY | Facility: CLINIC | Age: 40
End: 2020-12-16

## 2021-02-19 ENCOUNTER — APPOINTMENT (OUTPATIENT)
Dept: OTOLARYNGOLOGY | Facility: CLINIC | Age: 41
End: 2021-02-19
Payer: COMMERCIAL

## 2021-02-19 VITALS
HEART RATE: 65 BPM | SYSTOLIC BLOOD PRESSURE: 120 MMHG | WEIGHT: 198 LBS | HEIGHT: 63 IN | TEMPERATURE: 98 F | BODY MASS INDEX: 35.08 KG/M2 | DIASTOLIC BLOOD PRESSURE: 80 MMHG

## 2021-02-19 PROCEDURE — 69433 CREATE EARDRUM OPENING: CPT

## 2021-02-19 PROCEDURE — 99072 ADDL SUPL MATRL&STAF TM PHE: CPT

## 2021-02-19 PROCEDURE — 99213 OFFICE O/P EST LOW 20 MIN: CPT | Mod: 25

## 2021-02-19 RX ORDER — CLINDAMYCIN HYDROCHLORIDE 300 MG/1
300 CAPSULE ORAL EVERY 6 HOURS
Qty: 40 | Refills: 0 | Status: DISCONTINUED | COMMUNITY
Start: 2020-08-06 | End: 2021-02-19

## 2021-02-19 RX ORDER — SPIRONOLACTONE 50 MG/1
TABLET ORAL
Refills: 0 | Status: DISCONTINUED | COMMUNITY
End: 2021-02-19

## 2021-02-19 NOTE — HISTORY OF PRESENT ILLNESS
[de-identified] : 40 year old female follow up for ear fullness. Reports right ear feels clogged. Denies otalgia, otorrhea, tinnitus.

## 2021-02-19 NOTE — PROCEDURE
[FreeTextEntry3] : Procedure note: Right myringotomy and tube insertion\par \par Description of Operative Procedure:  Risks, benefits, and alternatives of the planned procedure were discussed with the patient prior to proceeding.  Risks would include but are not limited to bleeding, infection, persistent drainage, persistent perforation, or failure to improve hearing.  Benefits would include improvement in hearing.  The partially extruding right Paparella type II tube was removed.  A new Paparella type II tube was replaced.   The patient tolerated the procedure without complications.\par

## 2021-02-19 NOTE — REASON FOR VISIT
[Subsequent Evaluation] : a subsequent evaluation for [Other: _____] : [unfilled] [FreeTextEntry2] : ear fullness

## 2021-03-03 ENCOUNTER — APPOINTMENT (OUTPATIENT)
Dept: PHARMACY | Facility: CLINIC | Age: 41
End: 2021-03-03
Payer: SELF-PAY

## 2021-03-03 PROCEDURE — V5264D: CUSTOM | Mod: RT

## 2021-03-19 ENCOUNTER — APPOINTMENT (OUTPATIENT)
Dept: PHARMACY | Facility: CLINIC | Age: 41
End: 2021-03-19

## 2021-03-19 ENCOUNTER — NON-APPOINTMENT (OUTPATIENT)
Age: 41
End: 2021-03-19

## 2021-04-16 ENCOUNTER — APPOINTMENT (OUTPATIENT)
Dept: OTOLARYNGOLOGY | Facility: CLINIC | Age: 41
End: 2021-04-16
Payer: COMMERCIAL

## 2021-04-16 PROCEDURE — 92557 COMPREHENSIVE HEARING TEST: CPT

## 2021-04-16 PROCEDURE — 92567 TYMPANOMETRY: CPT

## 2021-04-16 PROCEDURE — 99072 ADDL SUPL MATRL&STAF TM PHE: CPT

## 2021-04-16 PROCEDURE — 99213 OFFICE O/P EST LOW 20 MIN: CPT | Mod: 25

## 2021-04-16 NOTE — HISTORY OF PRESENT ILLNESS
[de-identified] : 41 year female follow up ear check. S/p Right tympanoplasty with mastoidectomy, right myringotomy and tube insertion, 06/27/2019. Reports had ear infection in 03/19/2021, treated with Clindamycin and resolved. Reports right ear clogged. Reports right ear throbbing from root canal 04/12/2021. Denies otalgia, otorrhea, hearing loss, tinnitus.

## 2021-04-24 ENCOUNTER — APPOINTMENT (OUTPATIENT)
Dept: PHARMACY | Facility: CLINIC | Age: 41
End: 2021-04-24
Payer: SELF-PAY

## 2021-04-24 PROCEDURE — V5299A: CUSTOM | Mod: NC

## 2021-07-09 ENCOUNTER — APPOINTMENT (OUTPATIENT)
Dept: OTOLARYNGOLOGY | Facility: CLINIC | Age: 41
End: 2021-07-09
Payer: COMMERCIAL

## 2021-07-09 VITALS
DIASTOLIC BLOOD PRESSURE: 84 MMHG | SYSTOLIC BLOOD PRESSURE: 131 MMHG | HEIGHT: 63 IN | HEART RATE: 80 BPM | BODY MASS INDEX: 35.44 KG/M2 | WEIGHT: 200 LBS

## 2021-07-09 PROCEDURE — 92504 EAR MICROSCOPY EXAMINATION: CPT

## 2021-07-09 PROCEDURE — 99213 OFFICE O/P EST LOW 20 MIN: CPT | Mod: 25

## 2021-07-09 PROCEDURE — 99072 ADDL SUPL MATRL&STAF TM PHE: CPT

## 2021-07-09 RX ORDER — CLINDAMYCIN HYDROCHLORIDE 300 MG/1
CAPSULE ORAL
Refills: 0 | Status: DISCONTINUED | COMMUNITY
End: 2021-07-09

## 2021-07-11 NOTE — CONSULT LETTER
[Please see my note below.] : Please see my note below. [Consult Closing:] : Thank you very much for allowing me to participate in the care of this patient.  If you have any questions, please do not hesitate to contact me. [Sincerely,] : Sincerely, [FreeTextEntry2] : Eric Mcgee MD \par  [FreeTextEntry3] : Dr. Deondre Bearden\par Otology/Neurotology\par [facility] \par [facility]

## 2021-07-11 NOTE — HISTORY OF PRESENT ILLNESS
[de-identified] : 41 year old female follow up for right clogged ear.  S/p Right tympanoplasty with mastoidectomy, right myringotomy and tube insertion, 06/27/2019. Reports right ear is no longer clogged. Reports intermittent right otorrhea, none for the past few weeks, used Tobramycin with relief. Patient denies otalgia, recent ear infections, hearing loss, tinnitus, dizziness, vertigo, headaches related to hearing.\par

## 2021-08-02 ENCOUNTER — APPOINTMENT (OUTPATIENT)
Dept: PHARMACY | Facility: CLINIC | Age: 41
End: 2021-08-02

## 2022-01-14 ENCOUNTER — APPOINTMENT (OUTPATIENT)
Dept: OTOLARYNGOLOGY | Facility: CLINIC | Age: 42
End: 2022-01-14

## 2023-01-17 ENCOUNTER — APPOINTMENT (OUTPATIENT)
Dept: PHARMACY | Facility: CLINIC | Age: 43
End: 2023-01-17

## 2023-01-18 ENCOUNTER — APPOINTMENT (OUTPATIENT)
Dept: OTOLARYNGOLOGY | Facility: CLINIC | Age: 43
End: 2023-01-18
Payer: COMMERCIAL

## 2023-01-18 VITALS
SYSTOLIC BLOOD PRESSURE: 127 MMHG | WEIGHT: 210 LBS | BODY MASS INDEX: 37.21 KG/M2 | DIASTOLIC BLOOD PRESSURE: 94 MMHG | HEIGHT: 63 IN | HEART RATE: 103 BPM

## 2023-01-18 PROCEDURE — 92504 EAR MICROSCOPY EXAMINATION: CPT

## 2023-01-18 PROCEDURE — 99213 OFFICE O/P EST LOW 20 MIN: CPT | Mod: 25

## 2023-01-18 PROCEDURE — 92567 TYMPANOMETRY: CPT

## 2023-01-18 PROCEDURE — 92557 COMPREHENSIVE HEARING TEST: CPT

## 2023-01-18 RX ORDER — PNV NO.95/FERROUS FUM/FOLIC AC 28MG-0.8MG
TABLET ORAL
Refills: 0 | Status: ACTIVE | COMMUNITY

## 2023-01-18 RX ORDER — ASCORBIC ACID 500 MG
TABLET ORAL
Refills: 0 | Status: ACTIVE | COMMUNITY

## 2023-01-18 RX ORDER — FENOFIBRATE 145 MG/1
TABLET ORAL
Refills: 0 | Status: ACTIVE | COMMUNITY

## 2023-01-18 RX ORDER — TOBRAMYCIN AND DEXAMETHASONE 3; 1 MG/ML; MG/ML
0.3-0.1 SUSPENSION/ DROPS OPHTHALMIC
Qty: 1 | Refills: 1 | Status: COMPLETED | COMMUNITY
Start: 2019-06-27 | End: 2023-01-18

## 2023-01-18 RX ORDER — GABAPENTIN 400 MG
400 TABLET ORAL
Refills: 0 | Status: ACTIVE | COMMUNITY

## 2023-01-18 RX ORDER — ALPRAZOLAM 0.25 MG/1
0.25 TABLET ORAL
Refills: 0 | Status: ACTIVE | COMMUNITY

## 2023-01-18 RX ORDER — ECHINACEA 400 MG
CAPSULE ORAL
Refills: 0 | Status: ACTIVE | COMMUNITY

## 2023-01-18 RX ORDER — PREDNISOLONE ACETATE 10 MG/ML
1 SUSPENSION/ DROPS OPHTHALMIC
Qty: 1 | Refills: 1 | Status: COMPLETED | COMMUNITY
Start: 2020-08-14 | End: 2023-01-18

## 2023-01-18 RX ORDER — LURASIDONE HYDROCHLORIDE 60 MG/1
TABLET, FILM COATED ORAL
Refills: 0 | Status: ACTIVE | COMMUNITY

## 2023-01-18 RX ORDER — CHROMIUM 200 MCG
TABLET ORAL
Refills: 0 | Status: ACTIVE | COMMUNITY

## 2023-01-18 RX ORDER — MULTIVITAMIN
TABLET ORAL
Refills: 0 | Status: ACTIVE | COMMUNITY

## 2023-01-18 RX ORDER — MUPIROCIN 20 MG/G
2 OINTMENT TOPICAL
Qty: 1 | Refills: 0 | Status: COMPLETED | COMMUNITY
Start: 2019-07-01 | End: 2023-01-18

## 2023-01-18 RX ORDER — BUPROPION HYDROCHLORIDE 200 MG/1
TABLET, FILM COATED ORAL
Refills: 0 | Status: ACTIVE | COMMUNITY

## 2023-01-18 NOTE — ASSESSMENT
[FreeTextEntry1] : Shanda Bojorquez presents for follow-up for her right eustachian tube dysfunction. She previously underwent right tympanomastoidectomy with tube insertion by me for chronic mastoiditis and recurrent otorrhea in June 2019.  Her last visit with me was in July 2021, at which time she had a patent dry PE tube.  She has since missed several follow-up appointments due to personal issues.  She presents again today with mild discomfort in her right ear.  Exam today shows an intact right tympanic membrane without effusion but a retained PE tube in the middle ear space.  A new audiogram today shows normal hearing bilaterally with type A tympanograms bilaterally.\par \par We discussed options for management including observation or ventilating tube removal, either via myringotomy or tympanomeatal flap.  Because the previously placed tube was a large Paparella type II tube (prior smaller tube placements had frequently led to clogging), I counseled her that there is a higher risk of residual perforation from attempted tube removal via myringotomy.  I also counseled her that observation was a reasonable approach unless she again became prone to recurrent ear infections.  She is inclined to move forward with tube removal under anesthesia via a transcanal tympanomeatal flap approach, but does not wish to schedule this until addressing some personal issues in the next few months.  I will see her back at that time for further discussion.

## 2023-01-18 NOTE — HISTORY OF PRESENT ILLNESS
[de-identified] : 42 year old female follow up for right clogged ear. S/p Right tympanoplasty with mastoidectomy, right myringotomy and tube insertion, 06/27/2019. \par Recent allergist visit states the ear tube are not in the correct place\par Patient has hx of anxiety now taking Latuda, xanax, Wellbutrin \par Reports pressure/clogged right ear \par Patient denies otalgia,otorrhea, bleeding, recent ear infections, hearing loss, tinnitus, dizziness, vertigo, headaches related to hearing.\par Audio completed 04/16/2021

## 2023-01-18 NOTE — CONSULT LETTER
[FreeTextEntry2] : Tuan Castellanos MD [FreeTextEntry1] : Dear Dr. Castellanos,\par \par Shanda Bojorquez presents for follow-up for her right eustachian tube dysfunction.  As you may know, she previously underwent right tympanomastoidectomy with tube insertion by me for chronic mastoiditis and recurrent otorrhea in June 2019.  Her last visit with me was in July 2021, at which time she had a patent dry PE tube.  She has since missed several follow-up appointments due to personal issues.  She presents again today with mild discomfort in her right ear.  Exam today shows an intact right tympanic membrane without effusion but a retained PE tube in the middle ear space.  A new audiogram today shows normal hearing bilaterally with type A tympanograms bilaterally.\par \par We discussed options for management including observation or ventilating tube removal, either via myringotomy or tympanomeatal flap.  Because the previously placed tube was a large Paparella type II tube (prior smaller tube placements had frequently led to clogging), I counseled her that there is a higher risk of residual perforation from attempted tube removal via myringotomy.  I also counseled her that observation was a reasonable approach unless she again became prone to recurrent ear infections.  She is inclined to move forward with tube removal under anesthesia via a transcanal tympanomeatal flap approach, but does not wish to schedule this until addressing some personal issues in the next few months.  I will see her back at that time for further discussion.\par \par Thank you once again for the opportunity to participate in your patient's care, and I will keep you informed as to her progress.\par \par Best regards,\par \par Kehinde Bearden MD\par Otology/Neurotology\par [facility]\par [facility]

## 2023-04-14 ENCOUNTER — APPOINTMENT (OUTPATIENT)
Dept: OTOLARYNGOLOGY | Facility: CLINIC | Age: 43
End: 2023-04-14

## 2023-05-14 NOTE — HISTORY OF PRESENT ILLNESS
[de-identified] : 39 year old female follow s/p Right tympanoplasty with mastoidectomy, right myringotomy and tube insertion, 6/27/19.  Reports mild intermittent discomfort in the Right ear. Denies otorrhea, hearing loss, tinnitus, dizziness, vertigo, headaches related to hearing. \par \par 
Vaccine status unknown

## 2024-01-11 ENCOUNTER — OFFICE (OUTPATIENT)
Facility: LOCATION | Age: 44
Setting detail: OPHTHALMOLOGY
End: 2024-01-11
Payer: COMMERCIAL

## 2024-01-11 DIAGNOSIS — H01.004: ICD-10-CM

## 2024-01-11 PROCEDURE — 99213 OFFICE O/P EST LOW 20 MIN: CPT | Performed by: OPHTHALMOLOGY

## 2024-01-11 ASSESSMENT — LID EXAM ASSESSMENTS: OS_BLEPHARITIS: LUL 1+

## 2024-03-06 ENCOUNTER — NON-APPOINTMENT (OUTPATIENT)
Age: 44
End: 2024-03-06

## 2024-04-15 ENCOUNTER — EMERGENCY (EMERGENCY)
Facility: HOSPITAL | Age: 44
LOS: 1 days | Discharge: HOME | End: 2024-04-15
Admitting: STUDENT IN AN ORGANIZED HEALTH CARE EDUCATION/TRAINING PROGRAM
Payer: COMMERCIAL

## 2024-04-15 VITALS
DIASTOLIC BLOOD PRESSURE: 85 MMHG | OXYGEN SATURATION: 100 % | TEMPERATURE: 98 F | HEART RATE: 91 BPM | RESPIRATION RATE: 20 BRPM | SYSTOLIC BLOOD PRESSURE: 144 MMHG

## 2024-04-15 DIAGNOSIS — Z98.890 OTHER SPECIFIED POSTPROCEDURAL STATES: Chronic | ICD-10-CM

## 2024-04-15 DIAGNOSIS — Z90.49 ACQUIRED ABSENCE OF OTHER SPECIFIED PARTS OF DIGESTIVE TRACT: Chronic | ICD-10-CM

## 2024-04-15 PROCEDURE — 99283 EMERGENCY DEPT VISIT LOW MDM: CPT

## 2024-04-15 NOTE — ED PROVIDER NOTE - PHYSICAL EXAMINATION
Vitals signed reviewed  General: Well appearing, NAD  HEENT: NC/AT, PERRLA, EOM intact with no pain, MMM       Ear: R tympanostomy tube noted. Ear drum not healed, with hole. No drainage, erythema noted   Neck: full ROM, no trachea deviation  Heart: RRR, normal s1/s2  Lungs: CTAB, normal WOB, no wheezing, rales, or rhonchi

## 2024-04-15 NOTE — ED ADULT TRIAGE NOTE - SPO2 (%)
Patient has the following symptoms: fever of 104, ear problems, cough  The symptoms have been present for last week Tuesday for ear, fever peaked.   100

## 2024-04-15 NOTE — ED PROVIDER NOTE - NSICDXPASTMEDICALHX_GEN_ALL_CORE_FT
PAST MEDICAL HISTORY:  Interstitial cystitis     Obese     PCOS (polycystic ovarian syndrome)     Pelvic floor dysfunction     Spontaneous  2015    Vulvodynia

## 2024-04-15 NOTE — ED ADULT NURSE NOTE - SUICIDE SCREENING QUESTION 1
FEMALE PELVIC MEDICINE  AND RECONSTRUCTIVE SURGERY  MD Darryn Mcelroy MD Abraham Shashoua, MD Alissa Schiller, PA-C      CHIEF COMPLAINT  Chief Complaint   Patient presents with   • New Patient   • Referral     Juanita Gonzalez MD    • Urinary Incontinence     When coughing & lifting heavy objects   • Urinary Frequency     Nocturia x2-3 when expecting period   Nocturia x1        REFERRING PROVIDER/PRIMARY CARE PROVIDER  Juanita Gonzalez MD    HISTORY OF PRESENT ILLNESS  I had the pleasure of seeing Ms. Morataya for consultation. She is a 52 year old     Who complains of long history of mixed urinary incontinence type symptoms.  She describes her stress incontinence symptoms as more bothersome and leaks with coughing/sneezing and occasionally during intercourse.  She reports nocturia x1-2 with occasional associated urinary incontinence.  She denies any pain associated to her urinary urgency or dysuria.  She also denies any significant bowel symptoms or pain during her course.  Patient does complain of history of heavy menses, but has decreased significantly over the last year.      Past Medical History:   Diagnosis Date   • Anemia    • Essential (primary) hypertension        Past Surgical History:   Procedure Laterality Date   • No past surgeries         Social History     Tobacco Use   • Smoking status: Never   • Smokeless tobacco: Never   Vaping Use   • Vaping Use: never used   Substance Use Topics   • Alcohol use: Never   • Drug use: Never       Family History   Problem Relation Age of Onset   • Patient is unaware of any medical problems Father    • Thyroid Mother    • Cancer Neg Hx    • Myocardial Infarction Neg Hx        MEDICATIONS  Outpatient Medications Marked as Taking for the 10/23/23 encounter (Office Visit) with Darryn Stoner MD   Medication Sig Dispense Refill   • amLODIPine (NORVASC) 2.5 MG tablet TAKE 1 TABLET BY MOUTH DAILY 90 tablet 3   • Ferrous Sulfate (Iron Slow Release) 143 (45 Fe) MG  Tab CR        Medications were reviewed and updated today.    REVIEW OF SYSTEMS  Review of Systems   Constitutional: Negative for chills, fever and weight loss.   HENT: Negative for congestion, ear discharge, hearing loss, sinus pain and sore throat.    Eyes: Negative for blurred vision and redness.   Respiratory: Negative for cough and wheezing.    Cardiovascular: Negative for chest pain and palpitations.   Gastrointestinal:        Per HPI   Genitourinary:        Per HPI   Musculoskeletal: Negative for back pain and joint pain.   Skin: Negative for rash.   Neurological: Negative for dizziness and speech change.   Endo/Heme/Allergies: Does not bruise/bleed easily.   Psychiatric/Behavioral: Negative for depression, substance abuse and suicidal ideas.   All other systems reviewed and are negative.      PHYSICAL EXAM  Visit Vitals  Temp 98.2 °F (36.8 °C) (Tympanic)   Resp 14   Ht 4' 10.66\" (1.49 m)   Wt 62.1 kg (137 lb)   LMP 10/14/2023 (Exact Date)   BMI 27.99 kg/m²       Constitutional: General Appearance: in no acute distress and current vital signs reviewed.   Head and Face: Atraumatic, no deformities, norm cephalic and normal facies.  Eyes: Inspection of Conjunctiva and Lids: no discharge, no eyelid swelling and no ptosis.  ENT: External Inspection of Ears & Nose: normal appearing outer ear and normal appearing nose.   Neck: Normal appearing neck and no mass was seen.  Lymphatic: Palpation of Lymph Nodes: no lymphadenopathy.      Pulmonary: no respiratory distress, normal respiratory rate and effort and no accessory muscle use.    Abdomen: Soft, nontender and nondistended.  Gastrointestinal: Normal anus.  External Genitalia: No abnormalities and no lesions.  Vagina: Normal vagina and pelvic floor muscle strength 2/5  Vaginal Discharge: No  Urethra: hypermobility  Bladder: Urinary catheterization performed today after procedure explained to patient.  Patient prepped with antiseptic swabs and a 12 [place] catheter  was inserted without difficulty.  80 mls of urine was drained from the bladder.  The catheter was removed and urine specimen sent to lab. Patient tolerated procedure without complication.  Positive cough stress test.  Cervix: normal  Uterus: The uterus was normal size, anteverted, nontender and mobile  Adnexa: Non-tender and not enlarged.  Musculoskeletal: ·  Gait and Station: normal gait.  Neurological: Oriented to person, oriented to place and oriented to time.     ASSESSMENT/PLAN  1. OAB (overactive bladder)    2. Urinary incontinence, mixed      In summary, this patient has symptoms consistent with mixed urinary incontinence. I had an extensive discussion with her regarding the etiology, differential diagnosis and management options. We discussed treatment options including weight loss, a bladder diet, bladder training, medical therapy, a pessary or Poise Impressa trial, pelvic floor exercises and surgical therapy with mid urethral sling or urethral bulking procedure. I reviewed the side effects of anticholinergic medication. We also discussed the importance of pelvic floor physical therapy in both management of urgency urinary incontinence and stress urinary incontinence. I reviewed the etiology of stress urinary incontinence and urethrovesical hypermobility. I provided her with written information regarding mid urethral sling procedures. We discussed the risks of surgery including infection, bleeding, damage to the bladder and bowel, mesh-related complications including infection, pain, and erosion that would require surgical revision. We also discussed possible increase in urinary urgency symptoms and possible urinary retention. I discussed the indications for surgery and success rates for patients with mixed symptoms.  We also discussed possible need for medical therapy if her urgency symptoms persist or increase following surgery. She would like to consider options and will follow-up with a phone call in 4  weeks regarding decision.    New -Total visit time spent with the patient was 30 minutes with greater than 50% of the total time spent on counseling and/or coordinating care, visit preparation and reviewing records   No

## 2024-04-15 NOTE — ED PROVIDER NOTE - OBJECTIVE STATEMENT
43 y/o female with no significant PMHx presents to the ED c.o of Right ear pain x weeks. Patient states she had a tympanostomy tube placed ~ 4 years ago, in which she had similar symptoms. Patient states that she reached out to Dr. Bearden, who performed her surgery who stated that the tube has been displaced and will require surgery if causes any issues. Patient states she feels as if fluid is in her ear drum. She denies fever, nausea, vomiting, facial pain or any other concerning symptoms at this time.

## 2024-04-15 NOTE — ED ADULT NURSE NOTE - OBJECTIVE STATEMENT
Pt arrives to intake 10B, A&Ox4, ambulatory at baseline. Pt C/O right ear pain x weeks. Pt states "tube placed to R ear a while ago". no changes in hearing. Respirations are even and unlabored. S1 and S2 present. Capillary refill is 2 seconds bilaterally. No nursing interventions needed at this time.

## 2024-04-15 NOTE — ED PROVIDER NOTE - BIRTH SEX
ICU End of Shift Summary. See flowsheets for vital signs and detailed assessment.    Changes this shift:     Neuro: Lethargic. Intermittent confusion overnight. Disoriented to situation and place.  CV/Hematology: VSS. Low K 3.1. MD notified.  Resp: Off BIPAP at 0415, transitioned to 3 LPM via nasal cannula with saturations > 95%  GI/: Anuric. Loose/watery BM this AM  Skin: Scattered bruising, ecchymosis on upper arms, shoulders  LDA: Subclavian central line in place infusing TPN.      Plan: Transfer to floor when bed available    Goal Outcome Evaluation:    Plan of Care Reviewed With: patient    Overall Patient Progress: improving    Outcome Evaluation: Tolerated 3 LPm of oxygen via nasal cannula with sats >95%       Female

## 2024-04-15 NOTE — ED ADULT TRIAGE NOTE - CHIEF COMPLAINT QUOTE
tube placed to R ear "a while ago"- p/w R ear pain x weeks- no changes in hearing- no pmhx
Dr. Chaparro

## 2024-04-15 NOTE — ED PROVIDER NOTE - PATIENT PORTAL LINK FT
You can access the FollowMyHealth Patient Portal offered by [facility] by registering at the following website: [email or web address]. By joining [facility]’s FollowMyHealth portal, you will also be able to view your health information using other applications (apps) compatible with our system.

## 2024-04-15 NOTE — ED PROVIDER NOTE - NSFOLLOWUPINSTRUCTIONS_ED_ALL_ED_FT
You were seen in our department for Ear pain  Follow up with your PMD in 48-72 hours for further monitoring.  Follow up with ENT within 1 week for further evaluation.  if you develop any chest pain, dizziness, high fevers, weakness, numbness, tingling, vision changes, or any worsening symptoms return to our ED for evaluation.    Dr. Himanshu Everett  [address] [place] [address], [address]  [place] [address]  [phone]

## 2024-04-15 NOTE — ED PROVIDER NOTE - CLINICAL SUMMARY MEDICAL DECISION MAKING FREE TEXT BOX
43 y/o female with no significant PMHx presents to the ED c.o of Right ear pain x weeks. Patient states she had a tympanostomy tube placed ~ 4 years ago, in which she had similar symptoms. Patient states that she reached out to Dr. Bearden and would not be able to get an appointment until June. Physical exam showed displaced tympanostomy tube, with no drainage, or erythema.    Impression: Ear pain due to tympanostomy tube    Plan:    ENT follow up

## 2024-04-16 ENCOUNTER — APPOINTMENT (OUTPATIENT)
Dept: OTOLARYNGOLOGY | Facility: CLINIC | Age: 44
End: 2024-04-16
Payer: COMMERCIAL

## 2024-04-16 VITALS
HEIGHT: 63 IN | BODY MASS INDEX: 37.92 KG/M2 | DIASTOLIC BLOOD PRESSURE: 72 MMHG | SYSTOLIC BLOOD PRESSURE: 112 MMHG | WEIGHT: 214 LBS | HEART RATE: 65 BPM

## 2024-04-16 DIAGNOSIS — H70.91 UNSPECIFIED MASTOIDITIS, RIGHT EAR: ICD-10-CM

## 2024-04-16 DIAGNOSIS — H70.11 CHRONIC MASTOIDITIS, RIGHT EAR: ICD-10-CM

## 2024-04-16 DIAGNOSIS — H69.91 UNSPECIFIED EUSTACHIAN TUBE DISORDER, RIGHT EAR: ICD-10-CM

## 2024-04-16 DIAGNOSIS — H60.391 OTHER INFECTIVE OTITIS EXTERNA, RIGHT EAR: ICD-10-CM

## 2024-04-16 DIAGNOSIS — H90.11 CONDUCTIVE HEARING LOSS, UNILATERAL, RIGHT EAR, WITH UNRESTRICTED HEARING ON THE CONTRALATERAL SIDE: ICD-10-CM

## 2024-04-16 PROCEDURE — 92557 COMPREHENSIVE HEARING TEST: CPT

## 2024-04-16 PROCEDURE — 92567 TYMPANOMETRY: CPT

## 2024-04-16 PROCEDURE — 69420 INCISION OF EARDRUM: CPT

## 2024-04-16 PROCEDURE — 99214 OFFICE O/P EST MOD 30 MIN: CPT | Mod: 25

## 2024-04-16 RX ORDER — METHYLPREDNISOLONE 4 MG/1
4 TABLET ORAL
Qty: 1 | Refills: 0 | Status: ACTIVE | COMMUNITY
Start: 2024-04-16 | End: 1900-01-01

## 2024-04-16 NOTE — ASSESSMENT
[FreeTextEntry1] : Otoscopic exam today shows intact right tympanic membrane with retained tube in middle ear space, no effusion.  Left tympanic membrane intact without effusion or retraction.  With patient's consent, recommended myringotomy and repositioning of tube in tympanic membrane, she agreed to proceed and this was performed without complication.  Tube now appears dry and patent in right ear.  Will also Rx Medrol Dosepak in the event any of her symptoms of pain and pressure are secondary to mastoid inflammation not evident on exam today.

## 2024-04-16 NOTE — PROCEDURE
Code violet called:    Patient was c/o trigeminal neuralgia pain, stating she was having a \"flare up\". Bedside RN stated she offered medication to patient, but she stated she could not take d/t her mouth hurting. The patient wanted to either go to the ED to see a doctor or a doctor come see her. It was reported that she ran out to the nurses station to go the ED to be seen by a doctor. Upon arriving to room, the patient was sitting in chair crying (without tears) that her left side of her face hurt. She stated she was sent to  at one time by Dr. Katty Paez for neurology and she was wanting Dr. Katty Paez to be called or to be transferred to  at this time. The patient kept repeating \"he knows my case, call him\". This writer explained at great length that neurology has been consulted here at [facility] and we can not make them come to see her. They will be here at their earliest convenience. This writer also explained at great length that at this time there is no reason to be transferred to  since we offer all services she needs at this time here at [facility]. The patient kept yelling at this writer that she didn't know anything about trigeminal neuralgia and she needed to look it up. This writer offered other sources of comfort other than narcotics. The patients attending noted that he does not want her to have narcotics and she is allergic to toradol. The patient then accused this writer of accusing the patient of being a drug abuser, but she did test positive to amphetamines and reported that she did have recent meth use. While discussing this case with my attending, the patient had a tonic clonic seizure and slid out of the chair. She did not hit her head and the seizure was witnessed. The patient did urinate on herself and is now in a postictal state. She was placed back in bed and given 2 mg IV ativan.  VSS TAYLER Fernández, APRN - CNP [FreeTextEntry3] : procedure note: Right myringotomy  Description of Operative Procedure:  Risks, benefits, and alternatives of the planned procedure were discussed with the patient prior to proceeding.  Risks would include but are not limited to bleeding, infection, persistent drainage, persistent perforation, or failure to improve hearing.  Benefits would include improvement in hearing.  Topical anesthetic was used to anesthetize the tympanic membrane.  A myringotomy was made.  Antibiotic drops were administered.  The patient tolerated the procedure without complications.

## 2024-04-16 NOTE — HISTORY OF PRESENT ILLNESS
[de-identified] : 43 y/o F presents for evaluation of right otalgia reports tube is behind ear drum causing significant of ear pain seen by ED and stated possible injury with qtip reports pain that is constant and throbbing, also complains of decreased hearing' presents for surgical discussion for possible tube removal under anesthesia denies any new issues with drainage

## 2024-05-15 ENCOUNTER — APPOINTMENT (OUTPATIENT)
Dept: OTOLARYNGOLOGY | Facility: CLINIC | Age: 44
End: 2024-05-15

## 2024-09-24 ENCOUNTER — EMERGENCY (EMERGENCY)
Facility: HOSPITAL | Age: 44
LOS: 1 days | Discharge: HOME | End: 2024-09-24
Attending: EMERGENCY MEDICINE | Admitting: EMERGENCY MEDICINE
Payer: COMMERCIAL

## 2024-09-24 VITALS
OXYGEN SATURATION: 99 % | SYSTOLIC BLOOD PRESSURE: 138 MMHG | TEMPERATURE: 98 F | RESPIRATION RATE: 18 BRPM | HEART RATE: 85 BPM | HEIGHT: 63 IN | DIASTOLIC BLOOD PRESSURE: 85 MMHG | WEIGHT: 220.46 LBS

## 2024-09-24 DIAGNOSIS — Z98.890 OTHER SPECIFIED POSTPROCEDURAL STATES: Chronic | ICD-10-CM

## 2024-09-24 DIAGNOSIS — F39 UNSPECIFIED MOOD [AFFECTIVE] DISORDER: ICD-10-CM

## 2024-09-24 DIAGNOSIS — Z90.49 ACQUIRED ABSENCE OF OTHER SPECIFIED PARTS OF DIGESTIVE TRACT: Chronic | ICD-10-CM

## 2024-09-24 PROCEDURE — 90792 PSYCH DIAG EVAL W/MED SRVCS: CPT

## 2024-09-24 PROCEDURE — 99284 EMERGENCY DEPT VISIT MOD MDM: CPT

## 2024-09-24 NOTE — ED ADULT TRIAGE NOTE - CHIEF COMPLAINT QUOTE
pt with hx depression and anxiety c/o of increased anxiety and depression for pats few days, pt denies any alcohol or drug use, denies SI or HI

## 2024-09-24 NOTE — ED BEHAVIORAL HEALTH ASSESSMENT NOTE - HPI (INCLUDE ILLNESS QUALITY, SEVERITY, DURATION, TIMING, CONTEXT, MODIFYING FACTORS, ASSOCIATED SIGNS AND SYMPTOMS)
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    On interview, pt reports feeling more depressed for about 1-2 weeks. She is unable to identify acute triggers. She reports difficulty sleeping, reduced appetite, low energy, low motivation. States she hasn't gone to work in about 1 week because of her depression. She reports some anhedonia. She denies hopelessness. She denies suicidal ideation, intent, or plan. She denies homicidal or violent ideation, intent, or plan. She denies manic or psychotic symptoms. She reports compliance with prescribed Lamictal. States she ran out of prescribed Valium yesterday (9/23/24). She denies withdrawal symptoms. She denies substance use.   See  note for collateral.

## 2024-09-24 NOTE — ED PROVIDER NOTE - NSFOLLOWUPINSTRUCTIONS_ED_ALL_ED_FT
You have been given information necessary to follow up with the  [facility] ([facility]) Crisis center & other outpatient  psychiatric clinics within your community    • [facility] walk in Crisis centre  [address] [place] [address] [phone] [email or web address]  Hours of operation:  Day	                                        Hours  Sunday                                  Closed  Monday                                9am - 3pm  Tuesday                                9am - 3pm  Wednesday                          9am - 3pm  Thursday                               9am - 3pm  Friday                                    9am - 3pm  Saturday                                Closed    .....additionally if your current problem is associated with drug or alcohol abuse further information can be obtained at the Drug Abuse Evaluation Health Referral Servce ([facility])    • [facility] [id number] [place] [address] [phone] [email or web address]    Additionally if more support and information and help is needed in the area of suicide prevention pleas3 feel free to contact :   • [place] [address]  Phone: 2-614-872-BFDT (2513)  Web Address: http://www.suicidepreventionlifeline.org  • Suicide Awareness Voices of Education  [address]  [place], [place]  Phone: 1-578.902.1809  Web Address: http://www.save.org    Depression    WHAT YOU NEED TO KNOW:  Depression is a medical condition that causes feelings of sadness or hopelessness that do not go away. Depression may cause you to lose interest in things you used to enjoy. These feelings may interfere with your daily life.  DISCHARGE INSTRUCTIONS:  Call your local emergency number (911 in the [place]) if:   •You think about harming yourself or someone else.  •You have done something on purpose to hurt yourself.  Call your therapist or doctor if:   •Your symptoms do not improve.  •You cannot make it to your next appointment.   •You have new symptoms.  •You have questions or concerns about your condition or care.  The following resources are available at any time to help you, if needed:   •National Suicide Prevention Lifeline: 1-690.333.7678 (3-243-162-TALK)  •Suicide Hotline: 1-872.415.3790 (3-199-XKBPLRD)  •For a list of international numbers: https://save.org/find-help/international-resources/  Medicines:   •Antidepressants may be given to improve or balance your mood. You may need to take this medicine for several weeks before you begin to feel better.  •Take your medicine as directed. Contact your healthcare provider if you think your medicine is not helping or if you have side effects. Tell him of her if you are allergic to any medicine. Keep a list of the medicines, vitamins, and herbs you take. Include the amounts, and when and why you take them. Bring the list or the pill bottles to follow-up visits. Carry your medicine list with you in case of an emergency.  Therapy is often used together with medicine to relieve depression. Therapy is a way for you to talk about your feelings and anything that may be causing depression. Therapy can be done alone or in a group. It may also be done with family members or a significant other.    Self-care:   •Get regular physical activity. Try to be active for 30 minutes, 3 to 5 days a week. Physical activity can help relieve depression. Work with your healthcare provider to develop a plan that you enjoy. It may help to ask someone to be active with you.  •Create a regular sleep schedule. A routine can help you relax before bed. Listen to music, read, or do yoga. Try to go to bed and wake up at the same time every day. Sleep is important for emotional health.  •Eat a variety of healthy foods. Healthy foods include fruits, vegetables, whole-grain breads, low-fat dairy products, lean meats, fish, and cooked beans. A healthy meal plan is low in fat, salt, and added sugar.  •Do not drink alcohol or use drugs. Alcohol and drugs can make depression worse. Talk to your therapist or doctor if you need help quitting.  Follow up with your healthcare provider as directed: Your healthcare provider will monitor your progress at follow-up visits. He or she will also monitor your medicine if you take antidepressants. Your healthcare provider will ask if the medicine is helping. Tell him or her about any side effects or problems you may have with your medicine. The type or amount of medicine may need to be changed. Write down your questions so you remember to ask them during your visits.    Suicide Prevention  WHAT YOU NEED TO KNOW:  You may see suicide as the only way to escape emotional or physical pain and suffering. Help is available from people who care about you, and from professionals trained in suicide prevention. Prevention includes everything you and others can do to stop you from taking your life.  DISCHARGE INSTRUCTIONS:  Call your local emergency number (911 in the ), or ask someone to call if:   •You do something on purpose to hurt yourself  •You make a plan to commit suicide.  Call your doctor or therapist, or have someone close to you call if:   •You act out in anger, are reckless, or are abusing alcohol or drugs.  •You have serious thoughts of suicide, even with treatment.  •You have more thoughts of suicide when you are alone.  •You stop eating, or you begin to smoke cigarettes or drink alcohol.  •You have questions or concerns about your condition or care.  What to do if you are considering suicide:    •Contact a suicide prevention organization. The following are always available to help you: ?National Suicide Prevention Lifeline: 1-259.417.2742 (5-987-155-TALK)  ?Suicide Hotline: 1-437.727.8443 (6-542-QCNWCYT)  ?For a list of international numbers: https://save.org/find-help/international-resources/  •Contact your therapist. Your doctor can give you a list of therapists if you do not have one.  •Keep medicines, weapons, and alcohol out of your home.  •Do not spend time alone if you have thoughts of ending your life.  Warning signs of suicide: The following can help you and others recognize that you are struggling:   •Talking about your plan for committing suicide, or wanting to read or write about death or suicide  •Cutting yourself, burning your skin with cigarettes, or driving recklessly  •Drug or alcohol use, not taking your prescribed medicine, or taking too much  •Not wanting to spend time with others or doing things you enjoy, feeling bored, or not wanting anyone to praise you  •Changes in your appetite, sleep habits, energy levels, or weight  •Feeling angry, or lashing out at others  •A need to give away or throw away your belonging  •Often skipping work  •Suddenly not taking medicine for a mental illness without talking to your healthcare provider  •Suddenly not going to therapy  Treatment for suicidal thoughts:   •Medicines may be given to prevent mood swings, or to decrease anxiety or depression. You will need to take all medicines as directed. A sudden stop can be harmful. It may take 4 to 6 weeks for the medicine to help you feel better.  •Suicide risk assessment means healthcare providers will ask questions about your suicide thoughts and plans. They will ask how often you think about suicide and if you have tried it before. They will ask if you have begun to hurt yourself, such as with cutting or reckless driving. They may ask if you have access to weapons or drugs.  •A safety plan includes a list of people or groups to contact if you have suicidal feelings again. The list may include friends, family members, a spiritual leader, and others you trust. You may be asked to make a verbal agreement or sign a contract that you will not try to harm yourself.  •A therapist can help you identify and change negative feelings or beliefs about yourself. This may help change the way you feel and act. A therapist can also help you find ways to cope with things that cannot be changed.  Manage depression:   •Get help for drug or alcohol abuse. Drugs and alcohol can make suicidal feelings worse and make you more likely to act on them. Drugs and alcohol can also cause or increase depression.  •Talk to someone you trust. Be honest about your thoughts and feelings about suicide. You can call a suicide prevention center if you do not want to talk to someone you know.  •Exercise as directed. Exercise can lift your mood, give you more energy, and make it easier to sleep.   •Eat a variety of healthy foods. Healthy foods include fruits, vegetables, whole-grain breads, lean meats, fish, low-fat dairy products, and beans. Try to eat regularly even if you do not feel hungry. Depression can increase from a lack of nutrition or if you are hungry for long periods of time.  •Create a sleep routine. Try to go to bed and wake up at the same time every day. Let your healthcare provider know if you are having trouble sleeping.  •Take your medicine and go to therapy as directed. Medicine and therapy can help you manage your mental health. Do not stop taking your medicine without talking to your healthcare provider. If you do not like the way a medicine makes you feel, you may be able to try a different medicine.    **Follow up with your doctor or therapist as directed: Write down your questions so you remember to ask them during your visits.**

## 2024-09-24 NOTE — ED PROVIDER NOTE - PATIENT PORTAL LINK FT
You can access the FollowMyHealth Patient Portal offered by [facility] by registering at the following website: [email or web address]. By joining [employer]’s FollowMyHealth portal, you will also be able to view your health information using other applications (apps) compatible with our system.

## 2024-09-24 NOTE — ED ADULT NURSE NOTE - OBJECTIVE STATEMENT
Pt arrives via walk-in , has a  hx depression and anxiety c/o of increased anxiety and depression for past few days, pt denies any alcohol or drug use, denies SI or HI.

## 2024-09-24 NOTE — ED PROVIDER NOTE - OBJECTIVE STATEMENT
44 yof with hx of depression currently only on Lamictal. Pt was on more medication with psychiatrist Dr. Galvin but stoppe din May. Pt presents to ED with family (mom and dad) for worsening depressive sxs. Pt has been having diff sleeping, eating and functioning well at home. Pt denies HI but will not answer questions on SI. Pt has no hx of suicide attempts. Denies plan to hurt self. As per family they are worried about her decompensation but do not believe she would have harm herself. Works as a nurses aide. Some info from negra Lawson [phone]

## 2024-09-24 NOTE — ED BEHAVIORAL HEALTH ASSESSMENT NOTE - RISK ASSESSMENT
Risk factors include history of mood disorder, increased depression, impaired functioning.   Protective factors include no suicide attempts, no violence history, medication compliance, no access to guns, no substance abuse, supportive family, no suicidal ideation or homicidal ideation, future-oriented, identifies reasons for living.  Pt is not at acutely elevated risk of harm to self or others.

## 2024-09-24 NOTE — ED PROVIDER NOTE - BIRTH SEX
0730 Report received from SELAM Leija RN via [address] and [place]. Open eyes when name called making eye contact. Nods head indicating yes to questions, but not consistent. Spontaneous non-purposeful movement of LLE. Unable to follow commands. Scope SR. Generalized edema. Flexiseal intact liquid brown colored fecal material,. Abdomen remains distended and semi-soft. .  OG placement checks. Repositioned. Suctioned for moderate amount of thin whitish colored secretions. 0900 Dr. Rain Ledesma in. 
57  and daughter in acknowledges by nodding and looking towards daughter. Indicating yes. However yes respond to pain comfort  
1100 Dr. Zackery Simpson in 
1200 reposition and suctioned . Dr. Kayli Waters in continues to remain on vent on Spontaneous mode. Occasional guppy like breathing but less than 4/2. As of this time. 0  and daughter at bedside. Pt with open eyes looking around continues with occasional nodding but inconsistent 1830 awake looking around restless like movement. Respirations slightly labored at rest.  Diprivan  resumed. Reposition suction. External hemorrhoid noted 1924 report given to ALDO Walker RN via [address] and bedside format. Female

## 2024-09-24 NOTE — ED BEHAVIORAL HEALTH ASSESSMENT NOTE - DESCRIPTION
calm, cooperative, in good behavioral control throughout ED course; pt did not require prn meds or restraints  Vital Signs Last 24 Hrs  T(C): 36.7 (24 Sep 2024 11:15), Max: 36.7 (24 Sep 2024 11:15)  T(F): 98 (24 Sep 2024 11:15), Max: 98 (24 Sep 2024 11:15)  HR: 85 (24 Sep 2024 11:15) (85 - 85)  BP: 138/85 (24 Sep 2024 11:15) (138/85 - 138/85)  BP(mean): --  RR: 18 (24 Sep 2024 11:15) (18 - 18)  SpO2: 99% (24 Sep 2024 11:15) (99% - 99%)    Parameters below as of 24 Sep 2024 11:15  Patient On (Oxygen Delivery Method): room air denies see HPI

## 2024-09-24 NOTE — ED BEHAVIORAL HEALTH NOTE - BEHAVIORAL HEALTH NOTE
Writer called patient's mother Lulu  for collateral information.  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  States during the Pandemic patient became anxious and has been seeing a psychiatric nurse practitioner Willian Galvin and a therapist from [facility] in [place].  Patient has been on medication, pt's mother states to ask the patient for the name.    States patient has been on medication doing well, but discontinued her medication and started becoming anxious again, not wanting to go to work and complaining of not feeling well.  States she saw her providers who recently started her on medication, but doesn't know the name.    Patient spoke with her nurse practitioner yesterday who advised her to get a psychiatric evaluation in the emergency room for medication recommendations.  States her nurse practitioner didn't know if patient should change medications.   She denies patient is suicidal.  States patient will make statements like, "I don't want to do this anymore" but it's not suicidal in nature, patient is just seeking help.  Denies any drug use, no guns at home, no suicidal ideation, no violence, no self injurious behavior, denies patient has done anything dangerous.  States in the past patient has taken valium to sleep, but last night took Melatonin and it helped.  States patient's appetite is up and down, but patient is eating.  patient's parents will remain in the ED lobby to transport patient home.

## 2024-09-24 NOTE — ED BEHAVIORAL HEALTH ASSESSMENT NOTE - SUMMARY
45 y/o female, single, noncaregiver, lives with boyfriend, history of mood disorder, treated by nurse practitioner Willian Arreguin currently on Lamictal, no known h/o suicide attempts, no PMH, no h/o substance abuse, referred by mother for increased depression/impaired functioning.   Though pt is depressed and has impaired functioning (hasn't gone to work in about 1 week), she is still caring for her basic needs, and she denies SI/HI. She is not manic or psychotic. She is calm, cooperative, and in good behavioral control. Pt does not present an acute danger to self or others and does not meet criteria for involuntary psychiatric admission at this time. Pt declines voluntary psychiatric admission at this time.

## 2024-09-24 NOTE — ED PROVIDER NOTE - CLINICAL SUMMARY MEDICAL DECISION MAKING FREE TEXT BOX
44 yof with hx of depression with worsening depressive sxs and decompensation but not on usual meds - will ask psych to evaluate.

## 2024-09-24 NOTE — ED BEHAVIORAL HEALTH NOTE - BEHAVIORAL HEALTH NOTE
WRiter was informed patient is medically and psychiatrically cleared for discharge.  Writer called patient's mother Lulu  left a voicemail informing her patient will be discharged.

## 2024-09-24 NOTE — ED PROVIDER NOTE - PROGRESS NOTE DETAILS
NP Bola- psych consult recommendation out patient follow up. There is no clinical evidence of intoxication, or any acute medical problem requiring immediate intervention. There are no signs of emergency conditions requiring admission to the hospital. At present time she not a harm to self or others and can be safely discharge to follow up with psychiatrist.

## 2024-10-20 ENCOUNTER — EMERGENCY (EMERGENCY)
Facility: HOSPITAL | Age: 44
LOS: 1 days | Discharge: HOME | End: 2024-10-20
Attending: STUDENT IN AN ORGANIZED HEALTH CARE EDUCATION/TRAINING PROGRAM | Admitting: STUDENT IN AN ORGANIZED HEALTH CARE EDUCATION/TRAINING PROGRAM
Payer: COMMERCIAL

## 2024-10-20 VITALS
WEIGHT: 199.96 LBS | RESPIRATION RATE: 18 BRPM | HEIGHT: 63 IN | SYSTOLIC BLOOD PRESSURE: 126 MMHG | TEMPERATURE: 98 F | DIASTOLIC BLOOD PRESSURE: 81 MMHG | OXYGEN SATURATION: 98 % | HEART RATE: 85 BPM

## 2024-10-20 DIAGNOSIS — Z98.890 OTHER SPECIFIED POSTPROCEDURAL STATES: Chronic | ICD-10-CM

## 2024-10-20 DIAGNOSIS — Z90.49 ACQUIRED ABSENCE OF OTHER SPECIFIED PARTS OF DIGESTIVE TRACT: Chronic | ICD-10-CM

## 2024-10-20 PROCEDURE — 99284 EMERGENCY DEPT VISIT MOD MDM: CPT

## 2024-10-20 RX ORDER — VALACYCLOVIR 1000 MG/1
1 TABLET ORAL
Qty: 21 | Refills: 0
Start: 2024-10-20 | End: 2024-10-26

## 2024-10-20 RX ORDER — DIAZEPAM 10 MG/1
5 TABLET ORAL ONCE
Refills: 0 | Status: DISCONTINUED | OUTPATIENT
Start: 2024-10-20 | End: 2024-10-20

## 2024-10-20 RX ORDER — CLOTRIMAZOLE 100 MG
1 TABLET VAGINAL
Qty: 1 | Refills: 0
Start: 2024-10-20 | End: 2024-11-02

## 2024-10-20 RX ORDER — CETIRIZINE HYDROCHLORIDE 10 MG/1
10 TABLET ORAL ONCE
Refills: 0 | Status: COMPLETED | OUTPATIENT
Start: 2024-10-20 | End: 2024-10-20

## 2024-10-20 RX ORDER — CETIRIZINE HYDROCHLORIDE 10 MG/1
1 TABLET ORAL
Qty: 30 | Refills: 0
Start: 2024-10-20

## 2024-10-20 RX ORDER — DIAZEPAM 10 MG/1
1 TABLET ORAL
Qty: 6 | Refills: 0
Start: 2024-10-20 | End: 2024-10-22

## 2024-10-20 RX ORDER — FAMOTIDINE 40 MG
20 TABLET ORAL ONCE
Refills: 0 | Status: COMPLETED | OUTPATIENT
Start: 2024-10-20 | End: 2024-10-20

## 2024-10-20 RX ORDER — VALACYCLOVIR 1000 MG/1
1000 TABLET ORAL ONCE
Refills: 0 | Status: COMPLETED | OUTPATIENT
Start: 2024-10-20 | End: 2024-10-20

## 2024-10-20 RX ADMIN — DIAZEPAM 5 MILLIGRAM(S): 10 TABLET ORAL at 13:08

## 2024-10-20 RX ADMIN — Medication 20 MILLIGRAM(S): at 13:08

## 2024-10-20 RX ADMIN — CETIRIZINE HYDROCHLORIDE 10 MILLIGRAM(S): 10 TABLET ORAL at 13:08

## 2024-10-20 RX ADMIN — VALACYCLOVIR 1000 MILLIGRAM(S): 1000 TABLET ORAL at 13:07

## 2024-10-20 NOTE — ED PROVIDER NOTE - NSFOLLOWUPINSTRUCTIONS_ED_ALL_ED_FT
You were seen in the emergency department for your rash and anxiety. We have sent medications to your pharmacy for your rash and anxiety. Take these medications as prescribed until you can see your dermatologist and psychiatrist. Follow up with your psychiatrist and dermatologist this week.     Return to the emergency department if you have worsening anxiety or depression and you experience thoughts of hurting yourself or others, you experience worsening or spreading rash, worsening or uncontrolled pain, fevers 100.4°F or greater, recurrent vomiting, shortness of breath, discharge from your rash, or any other concerning symptoms.

## 2024-10-20 NOTE — ED ADULT NURSE NOTE - SUICIDE SCREENING QUESTION 1
Mother called back and made aware of below of results. Oral disclosure on file. Creatinine ordered. She states he drinks a lot of drinks.  She requests it to be faxed to [phone]   No

## 2024-10-20 NOTE — ED PROVIDER NOTE - PHYSICAL EXAMINATION
General: well nourished/well developed, NAD  Head: Atraumatic  Eyes: EOM grossly in tact, no scleral icterus  ENT: moist mucous membranes  Neurology: A&Ox3, nonfocal, CURTIS x 4  Respiratory: normal respiratory effort  CV: Extremities warm and well perfused  Abdominal: Soft, non-distended  Extremities: No edema  Skin:   Psych: well kempt, anxious appearing General: well nourished/well developed, NAD  Head: Atraumatic  Eyes: EOM grossly in tact, no scleral icterus  ENT: moist mucous membranes  Neurology: A&Ox3, nonfocal, CURTIS x 4  Respiratory: normal respiratory effort  CV: Extremities warm and well perfused  Abdominal: Soft, non-distended, non tender to palpation  Extremities: No edema  Skin: rash is not tender to palpation, not desquamating, vesicular rash with erythematous base, does not cross the midline, present in patches on right lower abdomen and right upper chest wall  Psych: well kempt, anxious appearing

## 2024-10-20 NOTE — ED ADULT NURSE NOTE - NSFALLUNIVINTERV_ED_ALL_ED
Bed/Stretcher in lowest position, wheels locked, appropriate side rails in place/Call bell, personal items and telephone in reach/Instruct patient to call for assistance before getting out of bed/chair/stretcher/Non-slip footwear applied when patient is off stretcher/[place] to call system/Physically safe environment - no spills, clutter or unnecessary equipment/Purposeful proactive rounding/Room/bathroom lighting operational, light cord in reach

## 2024-10-20 NOTE — ED ADULT TRIAGE NOTE - GLASGOW COMA SCALE: EYE OPENING, MLM
AOx0, expressive>receptive aphasia, PERRL, EOMI (followed central commands briskly), BUE Ag no FC, BLE 5/5
(E4) spontaneous

## 2024-10-20 NOTE — ED PROVIDER NOTE - NSICDXPASTMEDICALHX_GEN_ALL_CORE_FT
PAST MEDICAL HISTORY:  Anxiety and depression     Interstitial cystitis     Obese     PCOS (polycystic ovarian syndrome)     Pelvic floor dysfunction     Spontaneous  2015    Vulvodynia

## 2024-10-20 NOTE — ED ADULT TRIAGE NOTE - CHIEF COMPLAINT QUOTE
c/o rash to the rash on the torso after taking Lamotrigine, reports rash is itching, took some benadryl with no relief, pt also reports feeling anxious and is not taking her anxiety medications, denies S/I, H/I, Or A/V/H, answers to questions appropriately, calm and cooperative in triage. Pt requests to be treated medically first and then have a psych consult.

## 2024-10-20 NOTE — ED ADULT NURSE NOTE - OBJECTIVE STATEMENT
pt received to intake complaining of a rash to her torso. pt states she has been itching for the past day. pt also states she has been feeling anxious and has not been taking her anxiety meds. pt denies SI/HI. pt appears calm and cooperative.

## 2024-10-20 NOTE — ED PROVIDER NOTE - CLINICAL SUMMARY MEDICAL DECISION MAKING FREE TEXT BOX
Patient is a 44-year-old female past medical history of anxiety who presents to the emergency department for evaluation of one week of rash and persistent anxiety. Patient is a 44-year-old female past medical history of anxiety who presents to the emergency department for evaluation of one week of rash and persistent anxiety.     Patient's rash does not is not desquamating and does not resemble SJS or TEN.  Differential diagnoses include fungal versus shingles versus drug reaction as etiology of rash.  Rash does not cross the midline but does appear to involve multiple dermatomes it is vaguely remember resembles shingles but is not convincingly classic appearing.  Rash is not diffuse across the body making drug reaction less likely.  Patient's mother works in a dermatology office and will be able to arrange followed up for patient with dermatology tomorrow.  Plan to empirically treat for possible shingles.  Will also give second-generation antihistamine for itchiness and burning of rash.    For patient anxiety, patient is not actively suicidal or homicidal or threat to self or others at this time. She does not necessitate an emergent psychiatric consult in the emergency department at this time.  Plan to give single dose valium in the emergency department today to reduce her anxiety along with a short 3 day course to help address her anxiety until she can see psychiatry this week.

## 2024-10-20 NOTE — ED PROVIDER NOTE - PATIENT PORTAL LINK FT
You can access the FollowMyHealth Patient Portal offered by [facility] by registering at the following website: [email or web address]. By joining [employer]’s FollowMyHealth portal, you will also be able to view your health information using other applications (apps) compatible with our system.

## 2024-10-20 NOTE — ED PROVIDER NOTE - PROGRESS NOTE DETAILS
Hannah Moran MD, PGY-1: Patient re-assessed and she is feeling somewhat improved. Discussed with patient strict return precautions and need for follow up.  Patient expressed understanding and agrees with plan.

## 2024-10-20 NOTE — ED PROVIDER NOTE - OBJECTIVE STATEMENT
Patient is a 44-year-old female past medical history of anxiety who presents to the emergency department for evaluation of one week of rash and persistent anxiety.  Patient states she has had severe anxiety for the last 5 weeks and has been unable to work.  She started taking Lamictal 5 weeks ago and developed a rash 1 week ago.  She told her psychiatrist about the rash and was told to stop taking the Lamictal and has not been taking anything for anxiety other than Valium to sleep over the last week.  With her anxiety she has headache, neck pain, intermittent chest tightness, well as brain fog.  No thoughts of harming herself or others.  No auditory or visual hallucinations.  For her rash, patient states she took a Benadryl and used hydrocortisone cream at home without relief.  She states that Benadryl worsens her anxiety.

## 2024-10-20 NOTE — ED POST DISCHARGE NOTE - RESULT SUMMARY
Pt called [facility] saying that pharmacy did not receive rx for valium, I confirmed with pharmacy despite rx being sent in system here they did not receive rx, will resend.

## 2024-10-20 NOTE — ED PROVIDER NOTE - ATTENDING CONTRIBUTION TO CARE
I have personally performed a face to face medical and diagnostic evaluation of the patient. I have discussed with and reviewed the Resident's note and agree with the History, ROS, Physical Exam and MDM unless otherwise indicated. A brief summary of my personal evaluation and impression can be found below.    Cheo PAZ: 44-year-old female history of anxiety, presents with a chief complaint of 1 week and ration persistent anxiety, patient reports she recently started Lamictal started noticing a rash to her abdomen as well as her right shoulder, she describes the rash as itchy but sometimes it burns, no nausea vomiting no fever no chest pain no trouble breathing, she has tried hydrocortisone cream for the rash no  or oral involvement, rash is persisting prompting her visit, of note her mom works for dermatologist office, in addition patient reports persisting anxiety she feels like she is having difficulty sleeping and focusing, she is being followed by an outpatient psychologist and psychiatric team for this, she denies any SI HI AH or VH.    All other ROS negative, except as above and as per HPI and ROS section.    VITALS: Initial triage and subsequent vitals have been reviewed by me.  GEN APPEARANCE: Alert, non-toxic, well-appearing, NAD.  HEAD: Atraumatic.  EYES: PERRLa, EOMI, vision grossly intact.   NECK: Supple  CV: RRR, S1S2, no c/r/m/g. Cap refill <2 seconds. No bruits.   LUNGS: CTAB. No abnormal breath sounds.  ABDOMEN: Soft, NTND. No guarding or rebound.   MSK/EXT: No spinal or extremity point tenderness. No CVA ttp. Pelvis stable. No peripheral edema.  NEURO: Alert, follows commands. Weight bearing normal. Speech normal. Sensation and motor normal x4 extremities.   SKIN: Trace tinea like region told right shoulder, trace tinea like lesion to left arm, across the right abdomen concern for possible beefy red nonblanching rash versus possible small area of vesicles?. 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, I have less concern for a Lamictal drug reaction, given its timeline and overall presentation, patient also history of anxiety today she is requesting a psychiatric evaluation, however patient does not meet criteria for an emergency department psychiatric evaluation at time as she does not have any SI or HI AH or VH does not appear to pose a harm to somebody else or herself, I discussed this at length with the patient, we are in agreement for the patient to follow the psych resenting with her outpatient psychiatry team, to bridge patient until she can be seen by her outpatient psychiatry team she is reporting increased anxiety and difficulty sleeping, will give her a short course of Valium until an appointment can be made, given her rash will empirically treat for HSV/zoster, patient will be following up in the dermatology office tomorrow where her mother works, we will also send for possible fungal etiology with clotrimazole cream, given mild itch will also send in some Zyrtec, strict return precautions were discussed mother and patient in agreement and comfortable with plan.

## 2025-01-05 ENCOUNTER — NON-APPOINTMENT (OUTPATIENT)
Age: 45
End: 2025-01-05